# Patient Record
Sex: FEMALE | Race: BLACK OR AFRICAN AMERICAN | NOT HISPANIC OR LATINO | Employment: OTHER | URBAN - METROPOLITAN AREA
[De-identification: names, ages, dates, MRNs, and addresses within clinical notes are randomized per-mention and may not be internally consistent; named-entity substitution may affect disease eponyms.]

---

## 2023-01-09 ENCOUNTER — CLINICAL SUPPORT (OUTPATIENT)
Dept: PRIMARY CARE CLINIC | Facility: CLINIC | Age: 88
End: 2023-01-09
Payer: MEDICARE

## 2023-01-09 ENCOUNTER — TELEPHONE (OUTPATIENT)
Dept: PRIMARY CARE CLINIC | Facility: CLINIC | Age: 88
End: 2023-01-09
Payer: MEDICARE

## 2023-01-09 ENCOUNTER — LAB VISIT (OUTPATIENT)
Dept: PRIMARY CARE CLINIC | Facility: CLINIC | Age: 88
End: 2023-01-09
Payer: MEDICARE

## 2023-01-09 DIAGNOSIS — R53.83 FATIGUE, UNSPECIFIED TYPE: ICD-10-CM

## 2023-01-09 DIAGNOSIS — I10 ESSENTIAL HYPERTENSION, BENIGN: ICD-10-CM

## 2023-01-09 DIAGNOSIS — E11.65 INADEQUATELY CONTROLLED DIABETES MELLITUS: ICD-10-CM

## 2023-01-09 DIAGNOSIS — I10 ESSENTIAL HYPERTENSION, BENIGN: Primary | ICD-10-CM

## 2023-01-09 LAB
ALBUMIN SERPL BCP-MCNC: 3.9 G/DL (ref 3.5–5.2)
ALP SERPL-CCNC: 44 U/L (ref 55–135)
ALT SERPL W/O P-5'-P-CCNC: 8 U/L (ref 10–44)
ANION GAP SERPL CALC-SCNC: 10 MMOL/L (ref 8–16)
AST SERPL-CCNC: 13 U/L (ref 10–40)
BILIRUB SERPL-MCNC: 0.4 MG/DL (ref 0.1–1)
BUN SERPL-MCNC: 18 MG/DL (ref 10–30)
CALCIUM SERPL-MCNC: 9.5 MG/DL (ref 8.7–10.5)
CHLORIDE SERPL-SCNC: 103 MMOL/L (ref 95–110)
CO2 SERPL-SCNC: 26 MMOL/L (ref 23–29)
CREAT SERPL-MCNC: 1.2 MG/DL (ref 0.5–1.4)
EST. GFR  (NO RACE VARIABLE): 42.5 ML/MIN/1.73 M^2
ESTIMATED AVG GLUCOSE: 111 MG/DL (ref 68–131)
GLUCOSE SERPL-MCNC: 95 MG/DL (ref 70–110)
HBA1C MFR BLD: 5.5 % (ref 4–5.6)
POTASSIUM SERPL-SCNC: 4.6 MMOL/L (ref 3.5–5.1)
PROT SERPL-MCNC: 8 G/DL (ref 6–8.4)
SODIUM SERPL-SCNC: 139 MMOL/L (ref 136–145)
TSH SERPL DL<=0.005 MIU/L-ACNC: 0.98 UIU/ML (ref 0.4–4)

## 2023-01-09 PROCEDURE — 82570 ASSAY OF URINE CREATININE: CPT | Performed by: INTERNAL MEDICINE

## 2023-01-09 PROCEDURE — 83036 HEMOGLOBIN GLYCOSYLATED A1C: CPT | Performed by: INTERNAL MEDICINE

## 2023-01-09 PROCEDURE — 80053 COMPREHEN METABOLIC PANEL: CPT | Performed by: INTERNAL MEDICINE

## 2023-01-09 PROCEDURE — 84443 ASSAY THYROID STIM HORMONE: CPT | Performed by: INTERNAL MEDICINE

## 2023-01-10 LAB
ALBUMIN/CREAT UR: 7.3 UG/MG (ref 0–30)
CREAT UR-MCNC: 109 MG/DL (ref 15–325)
MICROALBUMIN UR DL<=1MG/L-MCNC: 8 UG/ML

## 2023-01-31 ENCOUNTER — OFFICE VISIT (OUTPATIENT)
Dept: PRIMARY CARE CLINIC | Facility: CLINIC | Age: 88
End: 2023-01-31
Payer: MEDICARE

## 2023-01-31 VITALS
WEIGHT: 191 LBS | DIASTOLIC BLOOD PRESSURE: 82 MMHG | SYSTOLIC BLOOD PRESSURE: 138 MMHG | BODY MASS INDEX: 28.95 KG/M2 | OXYGEN SATURATION: 97 % | HEART RATE: 76 BPM | HEIGHT: 68 IN | TEMPERATURE: 98 F

## 2023-01-31 DIAGNOSIS — E78.5 HYPERLIPIDEMIA, UNSPECIFIED HYPERLIPIDEMIA TYPE: Primary | ICD-10-CM

## 2023-01-31 DIAGNOSIS — K59.04 CHRONIC IDIOPATHIC CONSTIPATION: ICD-10-CM

## 2023-01-31 PROCEDURE — 1101F PR PT FALLS ASSESS DOC 0-1 FALLS W/OUT INJ PAST YR: ICD-10-PCS | Mod: CPTII,S$GLB,, | Performed by: INTERNAL MEDICINE

## 2023-01-31 PROCEDURE — 3288F PR FALLS RISK ASSESSMENT DOCUMENTED: ICD-10-PCS | Mod: CPTII,S$GLB,, | Performed by: INTERNAL MEDICINE

## 2023-01-31 PROCEDURE — 99213 PR OFFICE/OUTPT VISIT, EST, LEVL III, 20-29 MIN: ICD-10-PCS | Mod: GW,S$GLB,, | Performed by: INTERNAL MEDICINE

## 2023-01-31 PROCEDURE — 1160F PR REVIEW ALL MEDS BY PRESCRIBER/CLIN PHARMACIST DOCUMENTED: ICD-10-PCS | Mod: CPTII,S$GLB,, | Performed by: INTERNAL MEDICINE

## 2023-01-31 PROCEDURE — 1101F PT FALLS ASSESS-DOCD LE1/YR: CPT | Mod: CPTII,S$GLB,, | Performed by: INTERNAL MEDICINE

## 2023-01-31 PROCEDURE — 3288F FALL RISK ASSESSMENT DOCD: CPT | Mod: CPTII,S$GLB,, | Performed by: INTERNAL MEDICINE

## 2023-01-31 PROCEDURE — 1160F RVW MEDS BY RX/DR IN RCRD: CPT | Mod: CPTII,S$GLB,, | Performed by: INTERNAL MEDICINE

## 2023-01-31 PROCEDURE — 99213 OFFICE O/P EST LOW 20 MIN: CPT | Mod: GW,S$GLB,, | Performed by: INTERNAL MEDICINE

## 2023-01-31 PROCEDURE — 1126F PR PAIN SEVERITY QUANTIFIED, NO PAIN PRESENT: ICD-10-PCS | Mod: CPTII,S$GLB,, | Performed by: INTERNAL MEDICINE

## 2023-01-31 PROCEDURE — 1126F AMNT PAIN NOTED NONE PRSNT: CPT | Mod: CPTII,S$GLB,, | Performed by: INTERNAL MEDICINE

## 2023-01-31 PROCEDURE — 1159F PR MEDICATION LIST DOCUMENTED IN MEDICAL RECORD: ICD-10-PCS | Mod: CPTII,S$GLB,, | Performed by: INTERNAL MEDICINE

## 2023-01-31 PROCEDURE — 1159F MED LIST DOCD IN RCRD: CPT | Mod: CPTII,S$GLB,, | Performed by: INTERNAL MEDICINE

## 2023-01-31 RX ORDER — LISINOPRIL 40 MG/1
TABLET ORAL
COMMUNITY
Start: 2022-04-13 | End: 2023-02-16 | Stop reason: SDUPTHER

## 2023-01-31 RX ORDER — HYDROCHLOROTHIAZIDE 12.5 MG/1
1 CAPSULE ORAL DAILY
COMMUNITY
Start: 2022-09-13 | End: 2023-09-13 | Stop reason: SDUPTHER

## 2023-01-31 RX ORDER — FLUOXETINE HYDROCHLORIDE 20 MG/1
20 CAPSULE ORAL DAILY
COMMUNITY
End: 2023-01-31 | Stop reason: SDUPTHER

## 2023-01-31 RX ORDER — LORAZEPAM 0.5 MG/1
TABLET ORAL
COMMUNITY
End: 2024-01-10

## 2023-01-31 RX ORDER — FLUOXETINE HYDROCHLORIDE 20 MG/1
20 CAPSULE ORAL DAILY
Qty: 90 CAPSULE | Refills: 3 | Status: SHIPPED | OUTPATIENT
Start: 2023-01-31 | End: 2024-01-31

## 2023-01-31 RX ORDER — ASPIRIN 81 MG/1
TABLET ORAL
COMMUNITY

## 2023-01-31 RX ORDER — CLOPIDOGREL BISULFATE 75 MG/1
TABLET ORAL
COMMUNITY
Start: 2022-08-04 | End: 2023-03-31 | Stop reason: SDUPTHER

## 2023-01-31 RX ORDER — AMLODIPINE BESYLATE 10 MG/1
TABLET ORAL
COMMUNITY
Start: 2022-09-13 | End: 2023-09-13 | Stop reason: SDUPTHER

## 2023-01-31 RX ORDER — OMEPRAZOLE 40 MG/1
CAPSULE, DELAYED RELEASE ORAL
COMMUNITY
Start: 2022-05-05 | End: 2023-03-20 | Stop reason: SDUPTHER

## 2023-01-31 NOTE — PROGRESS NOTES
Subjective:       Patient ID: Steffany Brady is a 89 y.o. female.    Chief Complaint: Constipation (C/o constip , been out of linzess for 1 day/Also needs Rx for prozac, dep/No SI,HI)      Mrs Sullivan   is an established patient who presents today for a f/u visit and has few requests        Constipation  This is a chronic problem. The current episode started more than 1 year ago. The problem is unchanged. Her stool frequency is 2 to 3 times per week. The patient is not on a high fiber diet. She Does not exercise regularly. There has Not been adequate water intake. Pertinent negatives include no fever. Treatments tried: linzess. The treatment provided moderate relief.   Review of Systems   Constitutional:  Negative for activity change, fever and unexpected weight change.   Respiratory: Negative.     Cardiovascular: Negative.    Gastrointestinal:  Positive for constipation.   Neurological: Negative.        Objective:      Physical Exam  Vitals and nursing note reviewed.   Constitutional:       Appearance: Normal appearance.   Cardiovascular:      Rate and Rhythm: Normal rate and regular rhythm.   Pulmonary:      Effort: Pulmonary effort is normal.      Breath sounds: Normal breath sounds.   Musculoskeletal:      Cervical back: Normal range of motion and neck supple.   Neurological:      Mental Status: She is alert.       Assessment:       1. Chronic idiopathic constipation  Overview:  Constipation: Patient complains of constipation.  Stool pattern has been 3 formed stool(s) per week. Onset was 3 years ago Defecation has been difficult. Co-Morbid conditions:none. Symptoms have been stable. Current Health Habits: Eating fiber? yes, amt not enough Exercise?no Water intake? yes, amt not enough Current OTC/RX therapy has been stimulant linzess which has been effective.        Assessment & Plan:  Refill linzess               Plan:       I spent a total of 15 minutes on the day of the visit.This includes face to face time and  non-face to face time preparing to see the patient (eg, review of tests), obtaining and/or reviewing separately obtained history, documenting clinical information in the electronic or other health record, independently interpreting results and communicating results to the patient/family/caregiver, or care coordinator.

## 2023-02-16 RX ORDER — LISINOPRIL 40 MG/1
40 TABLET ORAL DAILY
Qty: 90 TABLET | Refills: 3 | Status: SHIPPED | OUTPATIENT
Start: 2023-02-16 | End: 2023-03-31 | Stop reason: SDUPTHER

## 2023-03-20 RX ORDER — OMEPRAZOLE 40 MG/1
40 CAPSULE, DELAYED RELEASE ORAL EVERY MORNING
Qty: 90 CAPSULE | Refills: 3 | Status: SHIPPED | OUTPATIENT
Start: 2023-03-20 | End: 2024-03-19

## 2023-03-20 NOTE — TELEPHONE ENCOUNTER
----- Message from Toya Nilda sent at 3/20/2023  9:47 AM CDT -----  Contact: pt nurse  Type:  RX Refill Request-2nd request  Pt is out and needing      Who Called:  the patient nurse    Refill or New Rx:  refill    RX Name and Strength:  omeprazole (PRILOSEC) 40 MG capsule   5/5/2022  --  Sig: omeprazole 40 mg capsule,delayed release      How is the patient currently taking it? (ex. 1XDay):  1x day  90  Is this a 30 day or 90 day RX:  90  Preferred Pharmacy with phone number:      [facility] [address] - [place], [place] - [address]  [address] [place] [address]  Phone: [phone] Fax: [phone]      Local or Mail Order:    Ordering Provider:  mike Antonio Call Back Number:  [phone]    Additional Information:

## 2023-03-30 ENCOUNTER — TELEPHONE (OUTPATIENT)
Dept: FAMILY MEDICINE | Facility: CLINIC | Age: 88
End: 2023-03-30
Payer: MEDICARE

## 2023-03-30 NOTE — TELEPHONE ENCOUNTER
----- Message from Destiney Cook sent at 3/30/2023 10:11 AM CDT -----  Contact: [facility]  Type:  RX Refill Request    Who Called: HOME HEALTH  Refill or New Rx:REFILL   RX Name and Strength: PLEASE CALL PT FOR RX NAME- PT STATED THAT THE LABEL WORE OFF THE BOTTLE. PHARMACY SENT OVER 2 REFILL REQUESTS TO OFFICE   How is the patient currently taking it? (ex. 1XDay):ONCE A DAY   Is this a 30 day or 90 day RX:90  Preferred Pharmacy with phone number:  [facility] [place] - [address]  [address]   [place] [address]  Phone: [phone] Fax: [phone]      Local or Mail Order:LOCAL  Ordering Provider:TULIO  Would the patient rather a call back or a response via [facility]? CALL   Best Call Back Number:[phone] (home) [phone] (work)    Additional Information: THANK YOU

## 2023-03-31 NOTE — TELEPHONE ENCOUNTER
----- Message from Julita Duvall sent at 3/31/2023 10:22 AM CDT -----  Contact: called at [phone]  Type:  RX Refill Request    Who Called: [facility] Nurse  Refill or New Rx:  Refill  RX Name and Strength:  lisinopriL (PRINIVIL,ZESTRIL) 40 MG tablet (clopidogreL (PLAVIX) 75 mg tablet)  How is the patient currently taking it? (ex. 1XDay):  As directed   Is this a 30 day or 90 day RX:  90  Preferred Pharm[facility] with phone number:    [facility] [place] [facility] [address]  [address] [address]  [place] [address]  Phone: [phone] Fax: [phone]  Local or Mail Order:  Local  Ordering Provider:  Dr. Eder Antonio Call Back Number: Pt's number [phone]  Additional Information:  The nurse is calling to get the pt's medication refilled. Med names lisinopriL (PRINIVIL,ZESTRIL) 40 MG tablet (clopidogreL (PLAVIX) 75 mg tablet). Please call back and advise.

## 2023-04-01 RX ORDER — LISINOPRIL 40 MG/1
40 TABLET ORAL DAILY
Qty: 90 TABLET | Refills: 3 | Status: SHIPPED | OUTPATIENT
Start: 2023-04-01 | End: 2023-04-04 | Stop reason: SDUPTHER

## 2023-04-01 RX ORDER — CLOPIDOGREL BISULFATE 75 MG/1
75 TABLET ORAL DAILY
Qty: 90 TABLET | Refills: 3 | Status: SHIPPED | OUTPATIENT
Start: 2023-04-01 | End: 2024-03-31

## 2023-04-04 RX ORDER — LISINOPRIL 40 MG/1
40 TABLET ORAL DAILY
Qty: 90 TABLET | Refills: 3 | Status: SHIPPED | OUTPATIENT
Start: 2023-04-04 | End: 2023-09-13 | Stop reason: SDUPTHER

## 2023-04-04 NOTE — TELEPHONE ENCOUNTER
----- Message from Iram Peacock sent at 4/4/2023  9:34 AM CDT -----  Type:  RX Refill Request    Who Called:  pt home health nurse--said she keep calling and pt med have not been refilled and she need this med--VIP--  Refill or New Rx:  refill  RX Name and Strength:  lisinopriL (PRINIVIL,ZESTRIL) 40 MG tablet  How is the patient currently taking it? (ex. 1XDay):  as directed  Is this a 30 day or 90 day RX:  90  Preferred Pharmacy with phone number:    [facility] - [facility] [place] - [address]  [address]   [facility] [place] [address]  Phone: [phone] Fax: [phone]    Local or Mail Order:  local  Ordering Provider:  Delano Antonio Call Back Number:  [phone] (home) [phone] (work)    Additional Information:  said the pharmacy called 3 times and have not been able to get in touch with the office--said she really need this filled--please call and advise

## 2023-05-02 ENCOUNTER — OFFICE VISIT (OUTPATIENT)
Dept: PRIMARY CARE CLINIC | Facility: CLINIC | Age: 88
End: 2023-05-02
Payer: MEDICARE

## 2023-05-02 VITALS
TEMPERATURE: 99 F | BODY MASS INDEX: 28.89 KG/M2 | DIASTOLIC BLOOD PRESSURE: 65 MMHG | RESPIRATION RATE: 16 BRPM | SYSTOLIC BLOOD PRESSURE: 100 MMHG | OXYGEN SATURATION: 98 % | HEIGHT: 68 IN | HEART RATE: 62 BPM | WEIGHT: 190.63 LBS

## 2023-05-02 DIAGNOSIS — I49.5 SSS (SICK SINUS SYNDROME): ICD-10-CM

## 2023-05-02 DIAGNOSIS — Z00.00 ENCOUNTER FOR ANNUAL WELLNESS VISIT (AWV) IN MEDICARE PATIENT: ICD-10-CM

## 2023-05-02 DIAGNOSIS — M35.3 PMR (POLYMYALGIA RHEUMATICA): Primary | ICD-10-CM

## 2023-05-02 DIAGNOSIS — I10 ESSENTIAL HYPERTENSION, BENIGN: ICD-10-CM

## 2023-05-02 DIAGNOSIS — E78.2 MIXED HYPERLIPIDEMIA: ICD-10-CM

## 2023-05-02 DIAGNOSIS — N18.31 STAGE 3A CHRONIC KIDNEY DISEASE: ICD-10-CM

## 2023-05-02 LAB
ALBUMIN SERPL BCP-MCNC: 3.7 G/DL (ref 3.5–5.2)
ALP SERPL-CCNC: 46 U/L (ref 55–135)
ALT SERPL W/O P-5'-P-CCNC: 12 U/L (ref 10–44)
ANION GAP SERPL CALC-SCNC: 11 MMOL/L (ref 8–16)
AST SERPL-CCNC: 14 U/L (ref 10–40)
BILIRUB SERPL-MCNC: 0.4 MG/DL (ref 0.1–1)
BUN SERPL-MCNC: 16 MG/DL (ref 10–30)
CALCIUM SERPL-MCNC: 9.4 MG/DL (ref 8.7–10.5)
CHLORIDE SERPL-SCNC: 105 MMOL/L (ref 95–110)
CHOLEST SERPL-MCNC: 180 MG/DL (ref 120–199)
CHOLEST/HDLC SERPL: 4.2 {RATIO} (ref 2–5)
CO2 SERPL-SCNC: 24 MMOL/L (ref 23–29)
CREAT SERPL-MCNC: 1.2 MG/DL (ref 0.5–1.4)
EST. GFR  (NO RACE VARIABLE): 42.5 ML/MIN/1.73 M^2
GLUCOSE SERPL-MCNC: 87 MG/DL (ref 70–110)
HDLC SERPL-MCNC: 43 MG/DL (ref 40–75)
HDLC SERPL: 23.9 % (ref 20–50)
LDLC SERPL CALC-MCNC: 115 MG/DL (ref 63–159)
NONHDLC SERPL-MCNC: 137 MG/DL
POTASSIUM SERPL-SCNC: 4.2 MMOL/L (ref 3.5–5.1)
PROT SERPL-MCNC: 7.8 G/DL (ref 6–8.4)
SODIUM SERPL-SCNC: 140 MMOL/L (ref 136–145)
TRIGL SERPL-MCNC: 110 MG/DL (ref 30–150)

## 2023-05-02 PROCEDURE — 1160F RVW MEDS BY RX/DR IN RCRD: CPT | Mod: CPTII,S$GLB,, | Performed by: INTERNAL MEDICINE

## 2023-05-02 PROCEDURE — G0439 PR MEDICARE ANNUAL WELLNESS SUBSEQUENT VISIT: ICD-10-PCS | Mod: GW,S$GLB,, | Performed by: INTERNAL MEDICINE

## 2023-05-02 PROCEDURE — 80053 COMPREHEN METABOLIC PANEL: CPT | Performed by: INTERNAL MEDICINE

## 2023-05-02 PROCEDURE — G0439 PPPS, SUBSEQ VISIT: HCPCS | Mod: GW,S$GLB,, | Performed by: INTERNAL MEDICINE

## 2023-05-02 PROCEDURE — 1101F PR PT FALLS ASSESS DOC 0-1 FALLS W/OUT INJ PAST YR: ICD-10-PCS | Mod: CPTII,S$GLB,, | Performed by: INTERNAL MEDICINE

## 2023-05-02 PROCEDURE — 1101F PT FALLS ASSESS-DOCD LE1/YR: CPT | Mod: CPTII,S$GLB,, | Performed by: INTERNAL MEDICINE

## 2023-05-02 PROCEDURE — 1125F AMNT PAIN NOTED PAIN PRSNT: CPT | Mod: CPTII,S$GLB,, | Performed by: INTERNAL MEDICINE

## 2023-05-02 PROCEDURE — 1160F PR REVIEW ALL MEDS BY PRESCRIBER/CLIN PHARMACIST DOCUMENTED: ICD-10-PCS | Mod: CPTII,S$GLB,, | Performed by: INTERNAL MEDICINE

## 2023-05-02 PROCEDURE — 1159F PR MEDICATION LIST DOCUMENTED IN MEDICAL RECORD: ICD-10-PCS | Mod: CPTII,S$GLB,, | Performed by: INTERNAL MEDICINE

## 2023-05-02 PROCEDURE — 3288F PR FALLS RISK ASSESSMENT DOCUMENTED: ICD-10-PCS | Mod: CPTII,S$GLB,, | Performed by: INTERNAL MEDICINE

## 2023-05-02 PROCEDURE — 1125F PR PAIN SEVERITY QUANTIFIED, PAIN PRESENT: ICD-10-PCS | Mod: CPTII,S$GLB,, | Performed by: INTERNAL MEDICINE

## 2023-05-02 PROCEDURE — 80061 LIPID PANEL: CPT | Performed by: INTERNAL MEDICINE

## 2023-05-02 PROCEDURE — 3288F FALL RISK ASSESSMENT DOCD: CPT | Mod: CPTII,S$GLB,, | Performed by: INTERNAL MEDICINE

## 2023-05-02 PROCEDURE — 1159F MED LIST DOCD IN RCRD: CPT | Mod: CPTII,S$GLB,, | Performed by: INTERNAL MEDICINE

## 2023-05-02 RX ORDER — DOCUSATE SODIUM 100 MG/1
100 CAPSULE, LIQUID FILLED ORAL 2 TIMES DAILY
COMMUNITY

## 2023-05-02 NOTE — Clinical Note
I'll scan all immuniz record into her chart I think only missing measure is COVID booster She got shingrix on 7/9/22 & 9/20/22 I'll order lipids today

## 2023-05-02 NOTE — ASSESSMENT & PLAN NOTE
I'll scan all immuniz record into her chart  I think only missing measure is COVID booster  She got shingrix on 7/9/22 & 9/20/22  I'll order lipids today  Walking , diet modif d/w pt  Eye : Dr MAN Holman : T [phone] , e-fax : [phone]

## 2023-05-02 NOTE — PROGRESS NOTES
Subjective:       Patient ID: Steffany Brady is a 89 y.o. female.    Chief Complaint: Follow-up (3 Month) and Annual Exam ([facility])      Annual Medicare wellness visit :  Reported by patient.  Diet and nutrition:  Healthy diet  Fracture risk:  No history of fractures; no recent explain infection; no sudden unexplained fractures; previous musculoskeletal injuries  Physical activity:  Exercise on a regular basis; recent increase in physical activity; good  Physical condition  Depression risk:  Never feels sad, empty or tearful; no loss of interest in activities; no significant changes in weight; no sleep disturbances or insomnia; no agitation; no loss of energy; no feelings of worthlessness or guilt; no thoughts of suicide; no history of depression; no history of mood disorders  Orientation:  No disorientation to time; no disorientation to date; no disorientation to place  Concentration and memory:  No decreased concentration ability; no memory lapses or loss; does not forget words  Speech/motor difficulties:  No speech difficulties; no difficulty expressing formulated concepts; no difficulty with fine manipulative tasks; no difficulty writing/coping; no slowed reaction time/; does not knock things over with trying to pick them up  Hearing: No loss of hearing  Vision:  No vision problems  Activities of daily living:  Able to bathe with limited or no assistance; able to control urination and bowels; able to dress with limited or no assistance; able to feed herself with limited or no assistance; able to get out of chair or bed with limited or no assistance; able to Groom with limited or no assistance; able to toilet with limited or no assistance  Instrumental activities of daily living: Able to do housework with limited or no assistance; able to grocery shop with limited or no assistance; able to manage medications with limited or no assistance; able to manage money with limited or no assistance; able to prepare meals  with limited or no assistance; able to use the phone with limited or no assistance  Falls risk assessment: No frequent falls while walking; no 4 in the past year; no falls since last visit; no dizziness/vertigo  Home safety:  No unsafe heike hazards; no unsafe stairs; no unsafe gas appliances; Working smoke/CO detectors; wears protective head gear for biking/high velocity; use of seatbelts; practicing 'safer sex'; no vision or hearing loss while driving; no firearms; has hand bars in the bathroom/shower; good lighting in the home      Follow-up    Review of Systems     Constitutional: No fever, no night sweats, no significant weight gain, no significant weight loss, no exercise intolerance.    Eyes: No dry eyes, no irritation, no vision change.    ENMT:    Ears: No difficulty hearing, no ear pain    Nose: No frequent nosebleeds, no nose/sinus problems    Mouth/throat: No sore throat, no bleeding gums, no snoring, no dry mouth, no mouth ulcers, no oral abnormalities, no teeth problems    Cardiovascular: No chest pain, no arm pain on exertion, no shortness of breath when walking, no shortness of breath when lying down, no palpitations, no known heart murmur    Respiratory: No cough, no wheezing, no shortness of breath, no coughing up blood    Gastrointestinal: No abdominal pain, no vomiting, normal appetite, no diarrhea, not vomiting blood.    Genitourinary: + incontinence, + difficulty urinating, + hematuria, + increased frequency.    Musculoskeletal: + muscle aches, + muscle weakness, + arthralgias/joint pain, no back pain, + swelling in extremities.    Skin: No abnormal mole, no jaundice, no rashes.    Neurologic: No loss of consciousness, + weakness, + numbness, no seizures, no dizziness, no headaches.    Psychiatric: Occasional depression, no sleep disturbances, feeling safe in the relationship, no alcohol abuse.    Endocrine: + fatigue.    Hematologic/lymphatic: No swollen glands, no  bruising.    Allergic/immunologic: No runny nose, no sinus pressure, no itching or hives, no frequent sneezing.    Review for Opioid Screening: Pt does not have Rx for Opioids (If patient has Rx list here)      Review for Substance Use Disorders: Patient does not use substance (If patient has Rx list here)             Objective:      Physical Exam  Frail PE :-    Constitutional:  General appearance: Frail elderly AA female using her cane & walker but in no acute disress  Level of distress: : NAD   Ambulation: ambulates.............. with assisstance      Head: Normocephalic, atraumatic.    ENMT    Oropharynx: Moist mucous membranes, no erythema, no exudates  Neck: Supple, trachea midline, no masses,FROM      Lungs  Respiratory effort: Poor  Auscultation: Breath sounds normal, poor air movement, CTA except as noted, no wheezing or rales/crackles, few ronchi    Cardiovascular:  Heart auscultation:RRR, normal S1, normal S2, no murmurs, no rubs, no gallops.  Neck vessels: No JVD, no carotid bruits      Abdominal examination.  Bowel sounds:normal  Inspection and palpation: Soft, nondistended, no tenderness, no guarding, no rebound tenderness, no masses, no CVA tenderness.        Musculoskeletal.  Deferred due to wheelchair status    Neurologic.  Gait: Tested secondary to electric scooter status    Creased monofilament sensation in upper and lower extremities      Skin.  Inspection and palpation: No rash, no lesions, no ulcers, no abnormal nevi, no induration, no nodules, good turgor, no jaundice.    Back: Deferred.    Assessment:       1. PMR (polymyalgia rheumatica)    2. SSS (sick sinus syndrome)    3. Stage 3a chronic kidney disease    4. Mixed hyperlipidemia  -     Lipid Panel; Future; Expected date: 05/02/2023    5. Essential hypertension, benign  -     Comprehensive Metabolic Panel; Future; Expected date: 05/02/2023    6. Encounter for annual wellness visit (AWV) in Medicare patient  Overview:  She presents for a  [employer] AWV    Assessment & Plan:  I'll scan all immuniz record into her chart  I think only missing measure is COVID booster  She got shingrix on 7/9/22 & 9/20/22  I'll order lipids today  Walking , diet modif d/w pt  Eye : Dr MAN Holman : [phone] , e-fax : [phone]               Plan:       1. PMR (polymyalgia rheumatica)    2. SSS (sick sinus syndrome)    3. Stage 3a chronic kidney disease    4. Mixed hyperlipidemia  -     Lipid Panel; Future; Expected date: 05/02/2023    5. Essential hypertension, benign  -     Comprehensive Metabolic Panel; Future; Expected date: 05/02/2023    6. Encounter for annual wellness visit (AWV) in Medicare patient  Overview:  She presents for a [employer] AWV    Assessment & Plan:  I'll scan all immuniz record into her chart  I think only missing measure is COVID booster  She got shingrix on 7/9/22 & 9/20/22  I'll order lipids today  Walking , diet modif d/w pt  Eye : Dr MAN Holman : [phone] , e-fax : [phone]    I offered to discuss end of life issues, including information on how to make advance directives that the patient could use to name someone who would make medical decisions on their behalf if they became too ill to make themselves.      __Patient declined       _X__Patient is interested, I provided paperwork and offered to discuss       Pain level : 0/10    Urinary incont : sometimes but not a big problem to patient

## 2023-05-03 ENCOUNTER — PATIENT OUTREACH (OUTPATIENT)
Dept: ADMINISTRATIVE | Facility: HOSPITAL | Age: 88
End: 2023-05-03
Payer: MEDICARE

## 2023-05-03 NOTE — PROGRESS NOTES
Population Health chart review completed, no outreach sent at this time.   The following record(s)  below were uploaded for Health Maintenance .    Records Received, hyper-linked into chart at this time.  IMMUNIZATIONS

## 2023-06-09 ENCOUNTER — OFFICE VISIT (OUTPATIENT)
Dept: PRIMARY CARE CLINIC | Facility: CLINIC | Age: 88
End: 2023-06-09
Payer: MEDICARE

## 2023-06-09 VITALS
OXYGEN SATURATION: 96 % | HEIGHT: 68 IN | HEART RATE: 72 BPM | SYSTOLIC BLOOD PRESSURE: 130 MMHG | DIASTOLIC BLOOD PRESSURE: 72 MMHG | WEIGHT: 192 LBS | TEMPERATURE: 98 F | BODY MASS INDEX: 29.1 KG/M2

## 2023-06-09 DIAGNOSIS — D17.21 LIPOMA OF RIGHT UPPER EXTREMITY: ICD-10-CM

## 2023-06-09 DIAGNOSIS — D17.21 LIPOMA OF RIGHT SHOULDER: Primary | ICD-10-CM

## 2023-06-09 DIAGNOSIS — Z00.00 PREVENTATIVE HEALTH CARE: ICD-10-CM

## 2023-06-09 DIAGNOSIS — R54 FRAIL ELDERLY: ICD-10-CM

## 2023-06-09 PROCEDURE — 99212 PR OFFICE/OUTPT VISIT, EST, LEVL II, 10-19 MIN: ICD-10-PCS | Mod: GW,S$GLB,, | Performed by: INTERNAL MEDICINE

## 2023-06-09 PROCEDURE — 1159F PR MEDICATION LIST DOCUMENTED IN MEDICAL RECORD: ICD-10-PCS | Mod: CPTII,S$GLB,, | Performed by: INTERNAL MEDICINE

## 2023-06-09 PROCEDURE — 99212 OFFICE O/P EST SF 10 MIN: CPT | Mod: GW,S$GLB,, | Performed by: INTERNAL MEDICINE

## 2023-06-09 PROCEDURE — 1159F MED LIST DOCD IN RCRD: CPT | Mod: CPTII,S$GLB,, | Performed by: INTERNAL MEDICINE

## 2023-06-09 PROCEDURE — 1125F AMNT PAIN NOTED PAIN PRSNT: CPT | Mod: CPTII,S$GLB,, | Performed by: INTERNAL MEDICINE

## 2023-06-09 PROCEDURE — 1125F PR PAIN SEVERITY QUANTIFIED, PAIN PRESENT: ICD-10-PCS | Mod: CPTII,S$GLB,, | Performed by: INTERNAL MEDICINE

## 2023-06-09 NOTE — PROGRESS NOTES
"Subjective:       Patient ID: Steffany Brady is a 89 y.o. female.    Chief Complaint: Cyst (Right shoulder. Abx given at last visit and it has not helped.)      Patient is established already .......... presents today with a c/o pain, lump of R shoulder        Cyst  Pertinent negatives include no fever.   Mass  This is a new problem. The current episode started 1 to 4 weeks ago. The problem occurs constantly. The problem has been gradually worsening. Pertinent negatives include no fever. Associated symptoms comments: Back pains. Nothing aggravates the symptoms. Treatments tried: Antibiotics.   Review of Systems   Constitutional:  Negative for activity change, fever and unexpected weight change.   Respiratory: Negative.     Cardiovascular: Negative.    Integumentary:  Positive for mole/lesion.   Neurological: Negative.        Objective:      Physical Exam  Vitals and nursing note reviewed. Exam conducted with a chaperone present.   Constitutional:       Appearance: Normal appearance.   HENT:      Head: Normocephalic and atraumatic.   Eyes:      Extraocular Movements: Extraocular movements intact.      Pupils: Pupils are equal, round, and reactive to light.   Cardiovascular:      Rate and Rhythm: Normal rate and regular rhythm.      Pulses: Normal pulses.      Heart sounds: Normal heart sounds.   Pulmonary:      Effort: Pulmonary effort is normal.      Breath sounds: Normal breath sounds.   Abdominal:      General: Abdomen is flat. Bowel sounds are normal.      Palpations: Abdomen is soft.   Musculoskeletal:      Cervical back: Normal range of motion and neck supple.      Comments: There's a 2 by 2" tender lipoma like lesion of back of R shoulder, mobile  No d/c , redness around it   Neurological:      Mental Status: She is alert.       Assessment:       1. Lipoma of right shoulder  -     Ambulatory referral/consult to Orthopedics; Future; Expected date: 06/16/2023    2. Lipoma of right upper extremity  Overview:  Started " 2 weeks ago    Assessment & Plan:  A lipoma of R shoulder  R/o inf sebaceous cyst  Refer to Ortho      3. Preventative health care  Overview:  No new c/o      Assessment & Plan:  Recommended COVID booster      4. Frail elderly  Overview:  With end stage CHF ,dementia      Assessment & Plan:  Cont hospice               Plan:       1. Lipoma of right shoulder  -     Ambulatory referral/consult to Orthopedics; Future; Expected date: 06/16/2023    2. Lipoma of right upper extremity  Overview:  Started 2 weeks ago    Assessment & Plan:  A lipoma of R shoulder  R/o inf sebaceous cyst  Refer to Ortho      3. Preventative health care  Overview:  No new c/o      Assessment & Plan:  Recommended COVID booster      4. Frail elderly  Overview:  With end stage CHF ,dementia      Assessment & Plan:  Cont hospice

## 2023-08-03 ENCOUNTER — OFFICE VISIT (OUTPATIENT)
Dept: PRIMARY CARE CLINIC | Facility: CLINIC | Age: 88
End: 2023-08-03
Payer: MEDICARE

## 2023-08-03 VITALS
DIASTOLIC BLOOD PRESSURE: 68 MMHG | OXYGEN SATURATION: 96 % | TEMPERATURE: 99 F | BODY MASS INDEX: 34.91 KG/M2 | HEART RATE: 71 BPM | RESPIRATION RATE: 18 BRPM | SYSTOLIC BLOOD PRESSURE: 132 MMHG | HEIGHT: 62 IN | WEIGHT: 189.69 LBS

## 2023-08-03 DIAGNOSIS — N18.31 CHRONIC KIDNEY DISEASE (CKD) STAGE G3A/A1, MODERATELY DECREASED GLOMERULAR FILTRATION RATE (GFR) BETWEEN 45-59 ML/MIN/1.73 SQUARE METER AND ALBUMINURIA CREATININE RATIO LESS THAN 30 MG/G: ICD-10-CM

## 2023-08-03 DIAGNOSIS — N18.31 STAGE 3A CHRONIC KIDNEY DISEASE: ICD-10-CM

## 2023-08-03 DIAGNOSIS — R64 CACHEXIA: Primary | ICD-10-CM

## 2023-08-03 DIAGNOSIS — R01.1 MURMUR, CARDIAC: ICD-10-CM

## 2023-08-03 PROCEDURE — 1160F PR REVIEW ALL MEDS BY PRESCRIBER/CLIN PHARMACIST DOCUMENTED: ICD-10-PCS | Mod: CPTII,S$GLB,, | Performed by: INTERNAL MEDICINE

## 2023-08-03 PROCEDURE — 99214 OFFICE O/P EST MOD 30 MIN: CPT | Mod: GW,S$GLB,, | Performed by: INTERNAL MEDICINE

## 2023-08-03 PROCEDURE — 1159F PR MEDICATION LIST DOCUMENTED IN MEDICAL RECORD: ICD-10-PCS | Mod: CPTII,S$GLB,, | Performed by: INTERNAL MEDICINE

## 2023-08-03 PROCEDURE — 1159F MED LIST DOCD IN RCRD: CPT | Mod: CPTII,S$GLB,, | Performed by: INTERNAL MEDICINE

## 2023-08-03 PROCEDURE — 1160F RVW MEDS BY RX/DR IN RCRD: CPT | Mod: CPTII,S$GLB,, | Performed by: INTERNAL MEDICINE

## 2023-08-03 PROCEDURE — 99214 PR OFFICE/OUTPT VISIT, EST, LEVL IV, 30-39 MIN: ICD-10-PCS | Mod: GW,S$GLB,, | Performed by: INTERNAL MEDICINE

## 2023-08-03 RX ORDER — VIT C/E/ZN/COPPR/LUTEIN/ZEAXAN 250MG-90MG
1000 CAPSULE ORAL
COMMUNITY

## 2023-08-03 RX ORDER — ONDANSETRON 4 MG/1
4 TABLET, ORALLY DISINTEGRATING ORAL EVERY 8 HOURS PRN
Qty: 20 TABLET | Refills: 2 | Status: SHIPPED | OUTPATIENT
Start: 2023-08-03

## 2023-08-03 RX ORDER — ONDANSETRON 8 MG/1
TABLET, ORALLY DISINTEGRATING ORAL
COMMUNITY
End: 2023-08-03

## 2023-08-03 RX ORDER — PRAVASTATIN SODIUM 20 MG/1
20 TABLET ORAL
COMMUNITY
End: 2024-01-10

## 2023-08-03 RX ORDER — FLUTICASONE PROPIONATE 50 MCG
SPRAY, SUSPENSION (ML) NASAL
COMMUNITY
Start: 2022-05-05 | End: 2023-09-13 | Stop reason: SDUPTHER

## 2023-08-03 RX ORDER — NYSTATIN 100000 U/G
CREAM TOPICAL
COMMUNITY

## 2023-08-03 RX ORDER — PRENATAL VIT CALC,IRON,FOLIC
1 TABLET ORAL DAILY
COMMUNITY

## 2023-08-03 RX ORDER — FLUOCINONIDE 0.5 MG/G
CREAM TOPICAL
COMMUNITY

## 2023-08-03 NOTE — PROGRESS NOTES
Subjective:       Patient ID: Steffany Brady is a 89 y.o. female.    Chief Complaint: Follow-up (3 month) and Congestive Heart Failure (With c/o of exertional dyspnea)      Patient is established already .......... presents today for a f/u visit , to discuss labs results and for management of the chronic conditions dafne CKD, exertional SOB        Follow-up  Pertinent negatives include no fever.   Congestive Heart Failure  Associated symptoms include shortness of breath. Pertinent negatives include no unexpected weight change. Her past medical history is significant for CAD.   Shortness of Breath  This is a chronic problem. The current episode started more than 1 year ago. The problem occurs intermittently. The problem has been unchanged. Associated symptoms include leg swelling and orthopnea. Pertinent negatives include no fever. The symptoms are aggravated by emotional upset and any activity. Treatments tried: See MAR , lasix. The treatment provided moderate relief. Her past medical history is significant for CAD and a heart failure.     Review of Systems   Constitutional:  Negative for activity change, fever and unexpected weight change.   Respiratory:  Positive for shortness of breath.    Cardiovascular:  Positive for orthopnea and leg swelling.   Neurological: Negative.          Objective:      Physical Exam  Vitals and nursing note reviewed.   Constitutional:       Comments: Pre frail  female walking with assistance and accompanied by caregiver   Cardiovascular:      Rate and Rhythm: Normal rate and regular rhythm.   Pulmonary:      Effort: Pulmonary effort is normal.      Breath sounds: Rhonchi and rales present.   Musculoskeletal:      Cervical back: Normal range of motion and neck supple.      Right lower leg: Edema present.      Left lower leg: Edema present.      Comments: Mild b/l ankle edema   Neurological:      Mental Status: She is alert.         Assessment:       1. Cachexia    2. Murmur,  cardiac  Overview:  Chronic with inc in exertional SOB recently    Assessment & Plan:  Cont to monitor  T/c inc lasix , adding spironolactone      3. Stage 3a chronic kidney disease  Overview:  Monitor  I discussed renal condition with patient at length  We discussed diet modification specifically decreasing salt intake, avoiding fast food ,avoiding fried food  We also discussed the importance of minimizing the use of anti-inflammatory agents like Motrin ibuprofen naproxen Aleve etc.  We discussed the importance of increasing fluid intake specifically water and also stay hydrated  We discussed minimizing the use of diuretics as much as clinically possible  We are going to monitor renal functions BUN creatinine urine microalbumin and electrolytes closely  To consider referral to Nephrology Service if renal fx continue to decline  To consider adding an SGL- 2 inhibitor if renal functions continue to decline, regardless of the blood sugar status      Orders:  -     Microalbumin/Creatinine Ratio, Urine; Future; Expected date: 08/03/2023  -     Comprehensive Metabolic Panel; Future; Expected date: 08/03/2023    4. Chronic kidney disease (CKD) stage G3a/A1, moderately decreased glomerular filtration rate (GFR) between 45-59 mL/min/1.73 square meter and albuminuria creatinine ratio less than 30 mg/g  -     Microalbumin/Creatinine Ratio, Urine; Future; Expected date: 08/03/2023  -     Comprehensive Metabolic Panel; Future; Expected date: 08/03/2023    Other orders  -     ondansetron (ZOFRAN-ODT) 4 MG TbDL; Take 1 tablet (4 mg total) by mouth every 8 (eight) hours as needed (nausea).  Dispense: 20 tablet; Refill: 2             Plan:       1. Cachexia    2. Murmur, cardiac  Overview:  Chronic with inc in exertional SOB recently    Assessment & Plan:  Cont to monitor  T/c inc lasix , adding spironolactone      3. Stage 3a chronic kidney disease  Overview:  Monitor  I discussed renal condition with patient at length  We  discussed diet modification specifically decreasing salt intake, avoiding fast food ,avoiding fried food  We also discussed the importance of minimizing the use of anti-inflammatory agents like Motrin ibuprofen naproxen Aleve etc.  We discussed the importance of increasing fluid intake specifically water and also stay hydrated  We discussed minimizing the use of diuretics as much as clinically possible  We are going to monitor renal functions BUN creatinine urine microalbumin and electrolytes closely  To consider referral to Nephrology Service if renal fx continue to decline  To consider adding an SGL- 2 inhibitor if renal functions continue to decline, regardless of the blood sugar status      Orders:  -     Microalbumin/Creatinine Ratio, Urine; Future; Expected date: 08/03/2023  -     Comprehensive Metabolic Panel; Future; Expected date: 08/03/2023    4. Chronic kidney disease (CKD) stage G3a/A1, moderately decreased glomerular filtration rate (GFR) between 45-59 mL/min/1.73 square meter and albuminuria creatinine ratio less than 30 mg/g  -     Microalbumin/Creatinine Ratio, Urine; Future; Expected date: 08/03/2023  -     Comprehensive Metabolic Panel; Future; Expected date: 08/03/2023    Other orders  -     ondansetron (ZOFRAN-ODT) 4 MG TbDL; Take 1 tablet (4 mg total) by mouth every 8 (eight) hours as needed (nausea).  Dispense: 20 tablet; Refill: 2

## 2023-08-07 PROBLEM — Z00.00 ENCOUNTER FOR ANNUAL WELLNESS VISIT (AWV) IN MEDICARE PATIENT: Status: RESOLVED | Noted: 2023-05-02 | Resolved: 2023-08-07

## 2023-09-11 PROBLEM — Z00.00 PREVENTATIVE HEALTH CARE: Status: RESOLVED | Noted: 2023-06-09 | Resolved: 2023-09-11

## 2023-09-13 RX ORDER — HYDROCHLOROTHIAZIDE 12.5 MG/1
12.5 CAPSULE ORAL DAILY
Qty: 90 CAPSULE | Refills: 1 | Status: SHIPPED | OUTPATIENT
Start: 2023-09-13 | End: 2023-09-15

## 2023-09-13 RX ORDER — LISINOPRIL 40 MG/1
40 TABLET ORAL DAILY
Qty: 90 TABLET | Refills: 3 | Status: SHIPPED | OUTPATIENT
Start: 2023-09-13 | End: 2024-09-12

## 2023-09-13 RX ORDER — AMLODIPINE BESYLATE 10 MG/1
TABLET ORAL
Qty: 90 TABLET | Refills: 3 | Status: SHIPPED | OUTPATIENT
Start: 2023-09-13

## 2023-09-13 RX ORDER — FLUTICASONE PROPIONATE 50 MCG
SPRAY, SUSPENSION (ML) NASAL
Qty: 16 G | Refills: 4 | Status: SHIPPED | OUTPATIENT
Start: 2023-09-13

## 2023-09-13 NOTE — TELEPHONE ENCOUNTER
Brianna Gaines,  Please review message below.  Patient is totally out of blood pressure medications  Last office visit: 8/3/2023  Signed:  Jeremy Flores LPN    ----- Message from Nina Sanderson sent at 9/13/2023  4:07 PM CDT -----  Name of Who is Calling: pt        What is the request in detail: pt would like to speak with staff in regards to there BP medication. Pt would like to know if she can receive a few pills until she can be seen. Pt stated she is completely out of her BP pill. Please assist with this matter        Can the clinic reply by [facility]: no        What Number to Call Back if not in [facility]: [phone] (home) [phone] (work)

## 2023-09-15 ENCOUNTER — TELEPHONE (OUTPATIENT)
Dept: FAMILY MEDICINE | Facility: CLINIC | Age: 88
End: 2023-09-15
Payer: MEDICARE

## 2023-09-15 RX ORDER — HYDROCHLOROTHIAZIDE 12.5 MG/1
12.5 CAPSULE ORAL EVERY OTHER DAY
Qty: 45 CAPSULE | Refills: 1 | Status: SHIPPED | OUTPATIENT
Start: 2023-09-15 | End: 2024-01-10

## 2023-09-15 NOTE — TELEPHONE ENCOUNTER
----- Message from Destiney Cook sent at 9/14/2023  4:34 PM CDT -----  Contact: CHAITANYA LOPEZ  Type:  Pharmacy Calling to Clarify an RX    Name of Caller:  Pharmacy Name:[facility]   Prescription Name: hydroCHLOROthiazide (MICROZIDE) 12.5 mg capsule  What do they need to clarify?:CONFIRM DIRECTIONS - PT STATED THAT SHE NORMALLY TAKES THIS MED EVERY OTHER DAY   Best Call Back Number: [phone] / [phone]  Additional Information: THANK YOU

## 2023-09-15 NOTE — TELEPHONE ENCOUNTER
----- Message from Destiney Cook sent at 9/14/2023  4:34 PM CDT -----  Contact: CHAITANYA LOPEZ  Type:  Pharmacy Calling to Clarify an RX    Name of Caller:  Pharmacy Name:[facility]   Prescription Name: hydroCHLOROthiazide (MICROZIDE) 12.5 mg capsule  What do they need to clarify?:CONFIRM DIRECTIONS - PT STATED THAT SHE NORMALLY TAKES THIS MED EVERY OTHER DAY   Best Call Back Number: [phone] / [phone]  Additional Information: THANK YOU

## 2023-11-20 ENCOUNTER — OFFICE VISIT (OUTPATIENT)
Dept: FAMILY MEDICINE | Facility: CLINIC | Age: 88
End: 2023-11-20
Payer: MEDICARE

## 2023-11-20 VITALS
BODY MASS INDEX: 33.43 KG/M2 | TEMPERATURE: 99 F | SYSTOLIC BLOOD PRESSURE: 130 MMHG | WEIGHT: 181.63 LBS | HEIGHT: 62 IN | DIASTOLIC BLOOD PRESSURE: 70 MMHG

## 2023-11-20 DIAGNOSIS — R52 ACHING: Primary | ICD-10-CM

## 2023-11-20 PROCEDURE — 1125F PR PAIN SEVERITY QUANTIFIED, PAIN PRESENT: ICD-10-PCS | Mod: CPTII,S$GLB,, | Performed by: INTERNAL MEDICINE

## 2023-11-20 PROCEDURE — 1160F RVW MEDS BY RX/DR IN RCRD: CPT | Mod: CPTII,S$GLB,, | Performed by: INTERNAL MEDICINE

## 2023-11-20 PROCEDURE — 1159F MED LIST DOCD IN RCRD: CPT | Mod: CPTII,S$GLB,, | Performed by: INTERNAL MEDICINE

## 2023-11-20 PROCEDURE — 1160F PR REVIEW ALL MEDS BY PRESCRIBER/CLIN PHARMACIST DOCUMENTED: ICD-10-PCS | Mod: CPTII,S$GLB,, | Performed by: INTERNAL MEDICINE

## 2023-11-20 PROCEDURE — 99212 PR OFFICE/OUTPT VISIT, EST, LEVL II, 10-19 MIN: ICD-10-PCS | Mod: GW,S$GLB,, | Performed by: INTERNAL MEDICINE

## 2023-11-20 PROCEDURE — 1125F AMNT PAIN NOTED PAIN PRSNT: CPT | Mod: CPTII,S$GLB,, | Performed by: INTERNAL MEDICINE

## 2023-11-20 PROCEDURE — 1159F PR MEDICATION LIST DOCUMENTED IN MEDICAL RECORD: ICD-10-PCS | Mod: CPTII,S$GLB,, | Performed by: INTERNAL MEDICINE

## 2023-11-20 PROCEDURE — 99212 OFFICE O/P EST SF 10 MIN: CPT | Mod: GW,S$GLB,, | Performed by: INTERNAL MEDICINE

## 2023-11-20 NOTE — PROGRESS NOTES
Subjective:       Patient ID: Steffany Brady is a 89 y.o. female.    Chief Complaint: Follow-up and Generalized Body Aches      Patient is established already .......... presents today for a f/u visit , to discuss labs results and for management of the chronic conditions.        Follow-up  This is a chronic problem. The current episode started more than 1 year ago. The problem occurs intermittently. The problem has been unchanged. Associated symptoms include arthralgias. Pertinent negatives include no fever. The symptoms are aggravated by standing. She has tried oral narcotics for the symptoms. The treatment provided moderate relief.     Review of Systems   Constitutional:  Negative for activity change, fever and unexpected weight change.   Respiratory: Negative.     Cardiovascular: Negative.    Musculoskeletal:  Positive for arthralgias.   Neurological: Negative.          Objective:      Physical Exam  Vitals and nursing note reviewed.   Constitutional:       Appearance: Normal appearance.      Comments: Frail elderly AA female   Cardiovascular:      Rate and Rhythm: Normal rate and regular rhythm.   Pulmonary:      Effort: Pulmonary effort is normal.      Breath sounds: Normal breath sounds.   Musculoskeletal:      Cervical back: Normal range of motion and neck supple.   Neurological:      Mental Status: She is alert.         Assessment:       1. Aching  Overview:  Rheumatological ROS: stable, mild-to-moderate joint symptoms intermittently, reasonably well controlled by PRN meds.   New concerns - no.   Exam: mild generalized muscle tenderness.   Assessment:  osteoarthritis stable.   Plan: current treatment plan is effective, no change in therapy.               Plan:       1. Aching  Overview:  Rheumatological ROS: stable, mild-to-moderate joint symptoms intermittently, reasonably well controlled by PRN meds.   New concerns - no.   Exam: mild generalized muscle tenderness.   Assessment:  osteoarthritis stable.   Plan:  current treatment plan is effective, no change in therapy.

## 2024-01-10 ENCOUNTER — OFFICE VISIT (OUTPATIENT)
Dept: FAMILY MEDICINE | Facility: CLINIC | Age: 89
End: 2024-01-10
Payer: MEDICARE

## 2024-01-10 VITALS
WEIGHT: 191 LBS | DIASTOLIC BLOOD PRESSURE: 71 MMHG | HEART RATE: 62 BPM | BODY MASS INDEX: 35.15 KG/M2 | OXYGEN SATURATION: 100 % | SYSTOLIC BLOOD PRESSURE: 139 MMHG | HEIGHT: 62 IN

## 2024-01-10 DIAGNOSIS — R42 DIZZINESS: Primary | ICD-10-CM

## 2024-01-10 DIAGNOSIS — F41.9 ANXIETY: ICD-10-CM

## 2024-01-10 DIAGNOSIS — K59.04 CHRONIC IDIOPATHIC CONSTIPATION: ICD-10-CM

## 2024-01-10 PROCEDURE — 99214 OFFICE O/P EST MOD 30 MIN: CPT | Mod: GW,S$GLB,, | Performed by: INTERNAL MEDICINE

## 2024-01-10 PROCEDURE — 1159F MED LIST DOCD IN RCRD: CPT | Mod: CPTII,S$GLB,, | Performed by: INTERNAL MEDICINE

## 2024-01-10 PROCEDURE — 1160F RVW MEDS BY RX/DR IN RCRD: CPT | Mod: CPTII,S$GLB,, | Performed by: INTERNAL MEDICINE

## 2024-01-10 RX ORDER — BUSPIRONE HYDROCHLORIDE 5 MG/1
5 TABLET ORAL 2 TIMES DAILY
Qty: 60 TABLET | Refills: 2 | Status: SHIPPED | OUTPATIENT
Start: 2024-01-10 | End: 2024-04-09

## 2024-01-10 NOTE — PROGRESS NOTES
Subjective:       Patient ID: Steffany Brady is a 89 y.o. female.    Chief Complaint: Hospital Follow Up (Dizzy, Anxious, lack of  enegry, SOB )      Patient is established already .......... presents today for a f/u visit , to discuss recent ER visit, dizziness and anxiety    Dizziness:   Chronicity:  Recurrent  Onset:  More than 1 year ago  Progression since onset:  Waxing and waning  Frequency:  Every few hours  Severity:  Mild  Duration:  Very brief  Dizziness characteristics:  Sensation of movement   Associated symptoms: hearing loss and light-headedness.no fever and no tinnitus.  Aggravated by:  Nothing  Risk factors:  Stroke  Treatments tried:  Valium  Improvements on treatment:  Mild   PMH includes: strokes and anxiety.    Review of Systems   Constitutional:  Negative for activity change, fever and unexpected weight change.   HENT:  Positive for hearing loss. Negative for tinnitus.    Respiratory: Negative.     Cardiovascular: Negative.    Neurological:  Positive for dizziness and light-headedness.         Objective:      Physical Exam  Vitals and nursing note reviewed.   Constitutional:       Appearance: Normal appearance.      Comments: Frail elderly AA female , NAD   Cardiovascular:      Rate and Rhythm: Normal rate and regular rhythm.   Pulmonary:      Effort: Pulmonary effort is normal.      Breath sounds: Normal breath sounds.   Musculoskeletal:      Cervical back: Normal range of motion and neck supple.   Skin:     General: Skin is warm.   Neurological:      General: No focal deficit present.      Mental Status: She is alert. She is disoriented.   Psychiatric:         Mood and Affect: Mood normal.         Assessment:       1. Dizziness  Overview:  Recent URI  Recent Strep throat  She was seen in ER @ [facility] 2 days ago    Assessment & Plan:  Stop ativan & HCZ  Recheck labs  Check TFTs        2. Anxiety  Overview:  Associated with living cond, dementia, being by herself most of the time      Assessment &  Plan:  Change ativan to buspar for less dizziness  T/c Hospice care  Continue home care , aid, MA ashlee pr and       3. Chronic idiopathic constipation  Overview:  Constipation: Patient complains of constipation.  Stool pattern has been 3 formed stool(s) per week. Onset was 3 years ago Defecation has been difficult. Co-Morbid conditions:none. Symptoms have been stable. Current Health Habits: Eating fiber? yes, amt not enough Exercise?no Water intake? yes, amt not enough Current OTC/RX therapy has been stimulant linzess which has been effective.        Assessment & Plan:  Cont colace  Refill linzess  Diet modif with inc fibers      Other orders  -     linaCLOtide (LINZESS) 290 mcg Cap capsule; Take 1 capsule (290 mcg total) by mouth before breakfast.  Dispense: 30 capsule; Refill: 2  -     busPIRone (BUSPAR) 5 MG Tab; Take 1 tablet (5 mg total) by mouth 2 (two) times daily.  Dispense: 60 tablet; Refill: 2           Plan:       1. Dizziness  Overview:  Recent URI  Recent Strep throat  She was seen in ER @ [facility] 2 days ago    Assessment & Plan:  Stop ativan & HCZ  Recheck labs  Check TFTs        2. Anxiety  Overview:  Associated with living cond, dementia, being by herself most of the time      Assessment & Plan:  Change ativan to buspar for less dizziness  T/c Hospice care  Continue home care , aid, MA ashlee [facility] and       3. Chronic idiopathic constipation  Overview:  Constipation: Patient complains of constipation.  Stool pattern has been 3 formed stool(s) per week. Onset was 3 years ago Defecation has been difficult. Co-Morbid conditions:none. Symptoms have been stable. Current Health Habits: Eating fiber? yes, amt not enough Exercise?no Water intake? yes, amt not enough Current OTC/RX therapy has been stimulant linzess which has been effective.        Assessment & Plan:  Cont colace  Refill linzess  Diet modif with inc fibers      Other orders  -     linaCLOtide (LINZESS) 290 mcg Cap capsule; Take 1  capsule (290 mcg total) by mouth before breakfast.  Dispense: 30 capsule; Refill: 2  -     busPIRone (BUSPAR) 5 MG Tab; Take 1 tablet (5 mg total) by mouth 2 (two) times daily.  Dispense: 60 tablet; Refill: 2

## 2024-01-10 NOTE — ASSESSMENT & PLAN NOTE
Change ativan to buspar for less dizziness  T/c Hospice care  Continue home care , aid, MA waiver prgm and SW

## 2024-01-30 ENCOUNTER — TELEPHONE (OUTPATIENT)
Dept: FAMILY MEDICINE | Facility: CLINIC | Age: 89
End: 2024-01-30
Payer: MEDICARE

## 2024-01-30 NOTE — TELEPHONE ENCOUNTER
----- Message from Delano Bernard MD sent at 1/10/2024  1:15 PM CST -----  Regarding: ER visit  Hi : Ms Jeter was seen at ER , [facility] in [place] on 1/8/24. Can you have labs, report of vsit sent to us.   Ty

## 2024-01-30 NOTE — TELEPHONE ENCOUNTER
----- Message from Delano Bernard MD sent at 1/10/2024  1:15 PM CST -----  Regarding: ER visit  Hi : Ms Jeter was seen at ER , [facility] in [place] on 1/8/24. Can you have labs, report of vsit sent to us.   Ty

## 2024-04-09 RX ORDER — BUSPIRONE HYDROCHLORIDE 5 MG/1
5 TABLET ORAL 2 TIMES DAILY
Qty: 60 TABLET | Refills: 0 | Status: SHIPPED | OUTPATIENT
Start: 2024-04-09 | End: 2024-05-13

## 2024-04-09 NOTE — TELEPHONE ENCOUNTER
No care due was identified.  [facility] Embedded Care Due Messages. Reference number: [id number].   4/09/2024 12:17:01 PM CDT

## 2024-04-09 NOTE — TELEPHONE ENCOUNTER
Refill Routing Note   Medication(s) are not appropriate for processing by [facility] for the following reason(s):        Outside of protocol    [facility] action(s):  Route               Appointments  past 12m or future 3m with PCP    Date Provider   Last Visit   1/10/2024 Delano Bernard MD   Next Visit   5/20/2024 Delano Bernard MD   ED visits in past 90 days: 0        Note composed:12:51 PM 04/09/2024

## 2024-04-14 NOTE — TELEPHONE ENCOUNTER
No care due was identified.  [facility] Embedded Care Due Messages. Reference number: [id number].   4/14/2024 8:24:24 AM CDT

## 2024-04-14 NOTE — TELEPHONE ENCOUNTER
Refill Routing Note   Medication(s) are not appropriate for processing by [facility] for the following reason(s):        Outside of protocol    [facility] action(s):  Route             Appointments  past 12m or future 3m with PCP    Date Provider   Last Visit   1/10/2024 Delano Bernard MD   Next Visit   5/20/2024 Delano Bernard MD   ED visits in past 90 days: 0        Note composed:9:31 AM 04/14/2024

## 2024-05-02 RX ORDER — CLOPIDOGREL BISULFATE 75 MG/1
75 TABLET ORAL
Qty: 90 TABLET | Refills: 2 | Status: SHIPPED | OUTPATIENT
Start: 2024-05-02

## 2024-05-02 NOTE — TELEPHONE ENCOUNTER
No care due was identified.  [facility] Embedded Care Due Messages. Reference number: [id number].   5/02/2024 1:45:22 PM CDT

## 2024-05-02 NOTE — TELEPHONE ENCOUNTER
Refill Routing Note   Medication(s) are not appropriate for processing by [facility] for the following reason(s):      Required labs abnormal    [facility] action(s):  Defer Care Due:  None identified            Appointments  past 12m or future 3m with PCP    Date Provider   Last Visit   1/10/2024 Delano Bernard MD   Next Visit   5/20/2024 Delano Bernard MD   ED visits in past 90 days: 0        Note composed:1:58 PM 05/02/2024

## 2024-05-13 RX ORDER — BUSPIRONE HYDROCHLORIDE 5 MG/1
5 TABLET ORAL 2 TIMES DAILY
Qty: 60 TABLET | Refills: 0 | Status: SHIPPED | OUTPATIENT
Start: 2024-05-13 | End: 2024-06-12

## 2024-05-13 NOTE — TELEPHONE ENCOUNTER
Refill Routing Note   Medication(s) are not appropriate for processing by [facility] for the following reason(s):        Outside of protocol    [facility] action(s):  Route             Appointments  past 12m or future 3m with PCP    Date Provider   Last Visit   1/10/2024 Delano Bernard MD   Next Visit   5/14/2024 Delano Bernard MD   ED visits in past 90 days: 0        Note composed:7:44 AM 05/13/2024

## 2024-05-13 NOTE — TELEPHONE ENCOUNTER
No care due was identified.  [facility] Embedded Care Due Messages. Reference number: [id number].   5/13/2024 7:44:34 AM CDT

## 2024-05-13 NOTE — TELEPHONE ENCOUNTER
----- Message from Destiney Cook sent at 5/13/2024  9:13 AM CDT -----  Contact: GEMMA, CAREGIVER  Type:  RX Refill Request    Who Called: GEMMA, CAREGIVER   Refill or New Rx: REFILL   RX Name and Strength: busPIRone (BUSPAR) 5 MG Tab  How is the patient currently taking it? (ex. 1XDay): ONE TAB TWICE A DAY   Is this a 30 day or 90 day RX: 90  [facility] with phone number:   [facility] [id number] [place] [facility] [address] [address]  [address] [address] [place] [address]  Phone: [phone] Fax: [phone]  Local or Mail Order:LOCAL  Ordering Provider: CHARLENE   Would the patient rather a call back or a response via [facility]? CALL   Best Call Back Number:[phone] (home) [phone] (work)  Additional Information: THANK YOU

## 2024-05-14 ENCOUNTER — OFFICE VISIT (OUTPATIENT)
Dept: FAMILY MEDICINE | Facility: CLINIC | Age: 89
End: 2024-05-14
Payer: MEDICARE

## 2024-05-14 ENCOUNTER — LAB VISIT (OUTPATIENT)
Dept: LAB | Facility: CLINIC | Age: 89
End: 2024-05-14
Payer: MEDICARE

## 2024-05-14 VITALS
BODY MASS INDEX: 32.37 KG/M2 | HEIGHT: 62 IN | DIASTOLIC BLOOD PRESSURE: 60 MMHG | TEMPERATURE: 99 F | SYSTOLIC BLOOD PRESSURE: 110 MMHG | HEART RATE: 69 BPM | WEIGHT: 175.88 LBS | OXYGEN SATURATION: 96 %

## 2024-05-14 DIAGNOSIS — R64 CACHEXIA: ICD-10-CM

## 2024-05-14 DIAGNOSIS — R54 FRAIL ELDERLY: ICD-10-CM

## 2024-05-14 DIAGNOSIS — E11.3293 TYPE 2 DIABETES MELLITUS WITH BOTH EYES AFFECTED BY MILD NONPROLIFERATIVE RETINOPATHY WITHOUT MACULAR EDEMA, WITHOUT LONG-TERM CURRENT USE OF INSULIN: ICD-10-CM

## 2024-05-14 DIAGNOSIS — E11.3293 TYPE 2 DIABETES MELLITUS WITH BOTH EYES AFFECTED BY MILD NONPROLIFERATIVE RETINOPATHY WITHOUT MACULAR EDEMA, WITHOUT LONG-TERM CURRENT USE OF INSULIN: Primary | ICD-10-CM

## 2024-05-14 DIAGNOSIS — N18.31 CHRONIC KIDNEY DISEASE (CKD) STAGE G3A/A1, MODERATELY DECREASED GLOMERULAR FILTRATION RATE (GFR) BETWEEN 45-59 ML/MIN/1.73 SQUARE METER AND ALBUMINURIA CREATININE RATIO LESS THAN 30 MG/G: ICD-10-CM

## 2024-05-14 DIAGNOSIS — R42 VERTIGO: ICD-10-CM

## 2024-05-14 DIAGNOSIS — M35.3 PMR (POLYMYALGIA RHEUMATICA): ICD-10-CM

## 2024-05-14 DIAGNOSIS — I11.0 HYPERTENSIVE HEART DISEASE WITH HEART FAILURE: ICD-10-CM

## 2024-05-14 DIAGNOSIS — Z00.00 ENCOUNTER FOR ANNUAL WELLNESS VISIT (AWV) IN MEDICARE PATIENT: ICD-10-CM

## 2024-05-14 DIAGNOSIS — N18.31 STAGE 3A CHRONIC KIDNEY DISEASE: ICD-10-CM

## 2024-05-14 PROCEDURE — 3074F SYST BP LT 130 MM HG: CPT | Mod: CPTII,S$GLB,, | Performed by: INTERNAL MEDICINE

## 2024-05-14 PROCEDURE — 83036 HEMOGLOBIN GLYCOSYLATED A1C: CPT | Performed by: INTERNAL MEDICINE

## 2024-05-14 PROCEDURE — G0439 PPPS, SUBSEQ VISIT: HCPCS | Mod: S$GLB,,, | Performed by: INTERNAL MEDICINE

## 2024-05-14 PROCEDURE — 1159F MED LIST DOCD IN RCRD: CPT | Mod: CPTII,S$GLB,, | Performed by: INTERNAL MEDICINE

## 2024-05-14 PROCEDURE — 99213 OFFICE O/P EST LOW 20 MIN: CPT | Mod: 25,S$GLB,, | Performed by: INTERNAL MEDICINE

## 2024-05-14 PROCEDURE — 1101F PT FALLS ASSESS-DOCD LE1/YR: CPT | Mod: CPTII,S$GLB,, | Performed by: INTERNAL MEDICINE

## 2024-05-14 PROCEDURE — 85025 COMPLETE CBC W/AUTO DIFF WBC: CPT | Performed by: INTERNAL MEDICINE

## 2024-05-14 PROCEDURE — 36415 COLL VENOUS BLD VENIPUNCTURE: CPT | Mod: ,,, | Performed by: INTERNAL MEDICINE

## 2024-05-14 PROCEDURE — 3288F FALL RISK ASSESSMENT DOCD: CPT | Mod: CPTII,S$GLB,, | Performed by: INTERNAL MEDICINE

## 2024-05-14 PROCEDURE — 80053 COMPREHEN METABOLIC PANEL: CPT | Performed by: INTERNAL MEDICINE

## 2024-05-14 PROCEDURE — 1160F RVW MEDS BY RX/DR IN RCRD: CPT | Mod: CPTII,S$GLB,, | Performed by: INTERNAL MEDICINE

## 2024-05-14 PROCEDURE — 1125F AMNT PAIN NOTED PAIN PRSNT: CPT | Mod: CPTII,S$GLB,, | Performed by: INTERNAL MEDICINE

## 2024-05-14 PROCEDURE — 1170F FXNL STATUS ASSESSED: CPT | Mod: CPTII,S$GLB,, | Performed by: INTERNAL MEDICINE

## 2024-05-14 PROCEDURE — 1158F ADVNC CARE PLAN TLK DOCD: CPT | Mod: CPTII,S$GLB,, | Performed by: INTERNAL MEDICINE

## 2024-05-14 PROCEDURE — 3078F DIAST BP <80 MM HG: CPT | Mod: CPTII,S$GLB,, | Performed by: INTERNAL MEDICINE

## 2024-05-14 RX ORDER — DICLOFENAC SODIUM 10 MG/G
2 GEL TOPICAL DAILY
Qty: 100 G | Refills: 0 | Status: SHIPPED | OUTPATIENT
Start: 2024-05-14 | End: 2024-05-29

## 2024-05-14 RX ORDER — MECLIZINE HYDROCHLORIDE 25 MG/1
50 TABLET ORAL 2 TIMES DAILY PRN
Qty: 60 TABLET | Refills: 0 | Status: SHIPPED | OUTPATIENT
Start: 2024-05-14 | End: 2024-06-13

## 2024-05-14 RX ORDER — MECLIZINE HYDROCHLORIDE 25 MG/1
50 TABLET ORAL 2 TIMES DAILY PRN
COMMUNITY
Start: 2024-05-03 | End: 2024-05-14 | Stop reason: SDUPTHER

## 2024-05-14 NOTE — PROGRESS NOTES
Subjective:       Patient ID: Steffany Brady is a 89 y.o. female.    Chief Complaint: Knee Pain (Hospital follow Right knee pain ), Otalgia, and Annual Exam      Frail  AWV HPI :-    Diet and Nutrition: healthy diet    Fracture Risk: no history of fractures; no recent explained fracture; no sudden unexplained fractures; previous musculoskeletal injuries    Physical Activity: doesnt exercise on a regular basis; recent dec. in physical activity;   v poor physical condition    Depression Risk: Occasionally feels sad, empty, or tearful; no loss of interest in activities; no significant changes in weight; no sleep disturbances or insomnia; no agitation; + loss of energy; no feelings of worthlessness or guilt; no thoughts of suicide; no history of depression; no history of mood disorders    Orientation:+ disorientation to time; + disorientation to date; + disorientation to place    Concentration and Memory: + decreased concentrating ability; + memory lapses / loss; he forgets words    Speech/Motor difficulties: no speech difficulties; no difficulty expressing formulated concepts; + difficulty with fine manipulative tasks; + difficulty writing/copying; + slowed reaction time; knocks things over when trying to pick them up    Hearing: Dec. hearing    Vision: no vision problems    Activities of Daily Living: Not able to bathe with limited or no assistance; not able to control urination and bowels; not able to dress with limited or no assistance; not able to feed self with limited or no assistance; not able to get out of chair or bed width limited or no assistance; not able to groom with limited or no assistance; not able to toilet with limited or no assistance  Instrumental Activities of Daily Living: not able to do house work with limited or no assistance; not able to grocery shop with limited or no assistance; not able to manage medications with limited or no assistance; not able to manage money with limited or no  assistance; not able to prepare meals with limited or no assistance; not able to use the phone with limited or no assistance    Falls Risknot Assessment: no frequent falls while walking :n/a .. no fall in the past year; no fall since last visit; no dizziness/vertigo    Home Safety: no unsafe heike hazards; no unsafe stairs; no unsafe gas appliances; working smoke/CO detectors; wears protective head gear for biking/high velocity; use of seat belts; practicing 'safer sex'; no vision or hearing loss while driving; no fire arms; has hand bars in the bathroom/shower; good lighting in the home        Knee Pain   The incident occurred more than 1 week ago. The incident occurred at home. There was no injury mechanism. The pain is present in the right knee. The pain is at a severity of 4/10. The pain is moderate. The pain has been Fluctuating since onset. Associated symptoms include muscle weakness. She reports no foreign bodies present. The symptoms are aggravated by movement and weight bearing. She has tried acetaminophen for the symptoms. The treatment provided mild relief.   Otalgia       Review of Systems   Constitutional:  Negative for activity change, fever and unexpected weight change.   HENT:  Positive for ear pain.    Respiratory: Negative.     Cardiovascular: Negative.    Musculoskeletal:  Positive for arthralgias and leg pain.   Neurological:  Positive for dizziness.         Review for Opioid Screening: Pt does not have Rx for Opioids (If patient has Rx list here)      Review for Substance Use Disorders: Patient does not use substance (If patient has Rx list here)       Objective:      Physical Exam  Frail PE :-    Constitutional:  General appearance: Frail elderly AA female sitting in her chair   Level of distress: : NAD   Ambulation:  Ambulates with difficulty      Head: Normocephalic, atraumatic.    ENMT    Oropharynx: Moist mucous membranes, no erythema, no exudates  Neck: Supple, trachea midline, no  masses,FROM      Lungs  Respiratory effort: Poor  Auscultation: Breath sounds normal, poor air movement, CTA except as noted, no wheezing or rales/crackles, few ronchi    Cardiovascular:  Heart auscultation:RRR, normal S1, normal S2, no murmurs, no rubs, no gallops.  Neck vessels: No JVD, no carotid bruits      Abdominal examination.  Bowel sounds:normal  Inspection and palpation: Soft, nondistended, no tenderness, no guarding, no rebound tenderness, no masses, no CVA tenderness.        Musculoskeletal.  Deferred due to wheelchair status    Neurologic.  Gait: Tested secondary to electric scooter status    Creased monofilament sensation in upper and lower extremities      Skin.  Inspection and palpation: No rash, no lesions, no ulcers, no abnormal nevi, no induration, no nodules, good turgor, no jaundice.    Back: Deferred.    I offered to discuss end of life issues, including information on how to make advance directives that the patient could use to name someone who would make medical decisions on their behalf if they became too ill to make themselves.      __Patient declined       _X__Patient is interested, I provided paperwork in the recent past and waiting on execution of a copy and offered to discuss further if needed         Assessment:       1. Type 2 diabetes mellitus with both eyes affected by mild nonproliferative retinopathy without macular edema, without long-term current use of insulin  Assessment & Plan:  Stable  Diet d/w pt, c/g  Monitor A1c    Orders:  -     Hemoglobin A1C; Future; Expected date: 05/14/2024    2. Hypertensive heart disease with heart failure  Assessment & Plan:  Compensated  Monitor   F/u with Card on 5/28    Orders:  -     Comprehensive Metabolic Panel; Future; Expected date: 05/14/2024  -     Ambulatory referral/consult to Hospice; Future; Expected date: 05/21/2024    3. Chronic kidney disease (CKD) stage G3a/A1, moderately decreased glomerular filtration rate (GFR) between 45-59  mL/min/1.73 square meter and albuminuria creatinine ratio less than 30 mg/g  -     Comprehensive Metabolic Panel; Future; Expected date: 05/14/2024  -     CBC Auto Differential; Future; Expected date: 05/14/2024  -     Ambulatory referral/consult to Hospice; Future; Expected date: 05/21/2024    4. Cachexia  Assessment & Plan:  Stable  T/c hospice     Orders:  -     CBC Auto Differential; Future; Expected date: 05/14/2024    5. PMR (polymyalgia rheumatica)  Assessment & Plan:  Stable  Cont POC       6. Stage 3a chronic kidney disease  Overview:  Monitor  I discussed renal condition with patient at length  We discussed diet modification specifically decreasing salt intake, avoiding fast food ,avoiding fried food  We also discussed the importance of minimizing the use of anti-inflammatory agents like Motrin ibuprofen naproxen Aleve etc.  We discussed the importance of increasing fluid intake specifically water and also stay hydrated  We discussed minimizing the use of diuretics as much as clinically possible  We are going to monitor renal functions BUN creatinine urine microalbumin and electrolytes closely  To consider referral to Nephrology Service if renal fx continue to decline  To consider adding an SGL- 2 inhibitor if renal functions continue to decline, regardless of the blood sugar status      Assessment & Plan:  Monitor GFR  Labs drawn today      7. Vertigo  Overview:  Recent URI  Recent Strep throat  She was seen in ER @ [facility] 2 days ago    Assessment & Plan:  Refill meclizine      8. Frail elderly  Overview:  With end stage CHF ,dementia      Assessment & Plan:  Hospice ref  Monitor   Falling precautions    Orders:  -     Ambulatory referral/consult to Hospice; Future; Expected date: 05/21/2024    9. Encounter for annual wellness visit (AWV) in Medicare patient  Overview:  [facility]    Assessment & Plan:  Get RSV , COVID vaccines  Diet , falling precautions d/w patient & c/g  I gave her a  copy of AMD in past. Await execution  We discussed tobacco, ETOH & physical activity  Dep screen : neg        Other orders  -     diclofenac sodium (VOLTAREN) 1 % Gel; Apply 2 g topically once daily. for 15 days  Dispense: 100 g; Refill: 0  -     meclizine (ANTIVERT) 25 mg tablet; Take 2 tablets (50 mg total) by mouth 2 (two) times daily as needed for Dizziness.  Dispense: 60 tablet; Refill: 0           Plan:       1. Type 2 diabetes mellitus with both eyes affected by mild nonproliferative retinopathy without macular edema, without long-term current use of insulin  Assessment & Plan:  Stable  Diet d/w pt, c/g  Monitor A1c    Orders:  -     Hemoglobin A1C; Future; Expected date: 05/14/2024    2. Hypertensive heart disease with heart failure  Assessment & Plan:  Compensated  Monitor   F/u with Card on 5/28    Orders:  -     Comprehensive Metabolic Panel; Future; Expected date: 05/14/2024  -     Ambulatory referral/consult to Hospice; Future; Expected date: 05/21/2024    3. Chronic kidney disease (CKD) stage G3a/A1, moderately decreased glomerular filtration rate (GFR) between 45-59 mL/min/1.73 square meter and albuminuria creatinine ratio less than 30 mg/g  -     Comprehensive Metabolic Panel; Future; Expected date: 05/14/2024  -     CBC Auto Differential; Future; Expected date: 05/14/2024  -     Ambulatory referral/consult to Hospice; Future; Expected date: 05/21/2024    4. Cachexia  Assessment & Plan:  Stable  T/c hospice     Orders:  -     CBC Auto Differential; Future; Expected date: 05/14/2024    5. PMR (polymyalgia rheumatica)  Assessment & Plan:  Stable  Cont POC       6. Stage 3a chronic kidney disease  Overview:  Monitor  I discussed renal condition with patient at length  We discussed diet modification specifically decreasing salt intake, avoiding fast food ,avoiding fried food  We also discussed the importance of minimizing the use of anti-inflammatory agents like Motrin ibuprofen naproxen Aleve etc.  We  discussed the importance of increasing fluid intake specifically water and also stay hydrated  We discussed minimizing the use of diuretics as much as clinically possible  We are going to monitor renal functions BUN creatinine urine microalbumin and electrolytes closely  To consider referral to Nephrology Service if renal fx continue to decline  To consider adding an SGL- 2 inhibitor if renal functions continue to decline, regardless of the blood sugar status      Assessment & Plan:  Monitor GFR  Labs drawn today      7. Vertigo  Overview:  Recent URI  Recent Strep throat  She was seen in ER @ [facility] 2 days ago    Assessment & Plan:  Refill meclizine      8. Frail elderly  Overview:  With end stage CHF ,dementia      Assessment & Plan:  Hospice ref  Monitor   Falling precautions    Orders:  -     Ambulatory referral/consult to Hospice; Future; Expected date: 05/21/2024    9. Encounter for annual wellness visit (AWV) in Medicare patient  Overview:  She presents for a [employer] AWV    Assessment & Plan:  Get RSV , COVID vaccines  Diet , falling precautions d/w patient & c/g  I gave her a copy of AMD in past. Await execution  We discussed tobacco, ETOH & physical activity  Dep screen : neg        Other orders  -     diclofenac sodium (VOLTAREN) 1 % Gel; Apply 2 g topically once daily. for 15 days  Dispense: 100 g; Refill: 0  -     meclizine (ANTIVERT) 25 mg tablet; Take 2 tablets (50 mg total) by mouth 2 (two) times daily as needed for Dizziness.  Dispense: 60 tablet; Refill: 0

## 2024-05-14 NOTE — ASSESSMENT & PLAN NOTE
Get RSV , COVID vaccines  Diet , falling precautions d/w patient & c/g  I gave her a copy of AMD in past. Await execution  We discussed tobacco, ETOH & physical activity  Dep screen : neg

## 2024-05-15 LAB
ALBUMIN SERPL BCP-MCNC: 3.3 G/DL (ref 3.5–5.2)
ALP SERPL-CCNC: 42 U/L (ref 55–135)
ALT SERPL W/O P-5'-P-CCNC: 7 U/L (ref 10–44)
ANION GAP SERPL CALC-SCNC: 7 MMOL/L (ref 8–16)
AST SERPL-CCNC: 12 U/L (ref 10–40)
BASOPHILS # BLD AUTO: 0.02 K/UL (ref 0–0.2)
BASOPHILS NFR BLD: 0.5 % (ref 0–1.9)
BILIRUB SERPL-MCNC: 0.5 MG/DL (ref 0.1–1)
BUN SERPL-MCNC: 13 MG/DL (ref 10–30)
CALCIUM SERPL-MCNC: 8.8 MG/DL (ref 8.7–10.5)
CHLORIDE SERPL-SCNC: 107 MMOL/L (ref 95–110)
CO2 SERPL-SCNC: 26 MMOL/L (ref 23–29)
CREAT SERPL-MCNC: 1.2 MG/DL (ref 0.5–1.4)
DIFFERENTIAL METHOD BLD: ABNORMAL
EOSINOPHIL # BLD AUTO: 0 K/UL (ref 0–0.5)
EOSINOPHIL NFR BLD: 1.1 % (ref 0–8)
ERYTHROCYTE [DISTWIDTH] IN BLOOD BY AUTOMATED COUNT: 14 % (ref 11.5–14.5)
EST. GFR  (NO RACE VARIABLE): 42.2 ML/MIN/1.73 M^2
ESTIMATED AVG GLUCOSE: 114 MG/DL (ref 68–131)
GLUCOSE SERPL-MCNC: 98 MG/DL (ref 70–110)
HBA1C MFR BLD: 5.6 % (ref 4–5.6)
HCT VFR BLD AUTO: 34.6 % (ref 37–48.5)
HGB BLD-MCNC: 10.9 G/DL (ref 12–16)
IMM GRANULOCYTES # BLD AUTO: 0 K/UL (ref 0–0.04)
IMM GRANULOCYTES NFR BLD AUTO: 0 % (ref 0–0.5)
LYMPHOCYTES # BLD AUTO: 1.2 K/UL (ref 1–4.8)
LYMPHOCYTES NFR BLD: 33.3 % (ref 18–48)
MCH RBC QN AUTO: 29.5 PG (ref 27–31)
MCHC RBC AUTO-ENTMCNC: 31.5 G/DL (ref 32–36)
MCV RBC AUTO: 94 FL (ref 82–98)
MONOCYTES # BLD AUTO: 0.6 K/UL (ref 0.3–1)
MONOCYTES NFR BLD: 15.1 % (ref 4–15)
NEUTROPHILS # BLD AUTO: 1.9 K/UL (ref 1.8–7.7)
NEUTROPHILS NFR BLD: 50 % (ref 38–73)
NRBC BLD-RTO: 0 /100 WBC
PLATELET # BLD AUTO: 147 K/UL (ref 150–450)
PMV BLD AUTO: 11.8 FL (ref 9.2–12.9)
POTASSIUM SERPL-SCNC: 3.5 MMOL/L (ref 3.5–5.1)
PROT SERPL-MCNC: 7.2 G/DL (ref 6–8.4)
RBC # BLD AUTO: 3.69 M/UL (ref 4–5.4)
SODIUM SERPL-SCNC: 140 MMOL/L (ref 136–145)
WBC # BLD AUTO: 3.72 K/UL (ref 3.9–12.7)

## 2024-05-22 ENCOUNTER — TELEPHONE (OUTPATIENT)
Dept: FAMILY MEDICINE | Facility: CLINIC | Age: 89
End: 2024-05-22
Payer: MEDICARE

## 2024-05-22 NOTE — TELEPHONE ENCOUNTER
Brianna Gaines,  Please review message below from patient's relative Corinne Sánchez on patient's condition.  Call placed to pt due to Dr. Gaines's orders to check on pt. Spoke w/Corinne Pascuals(relative) states pt was transported per ambulance to [facility] no update at this time but was informed that pt was having chest pains and bilateral swelling legs.   Please review.  Thank you.  Signed:  KYRA Whatley Magdy, MD P Gorgi Mikhail Magdy Staff  Caller: Unspecified (Today,  8:57 AM)  Hi Vivian :  the hospice nurse called me and said that she went to the emergency room so please double check on that and see  if she still needs something for gas  she can use over-the-counter gas X and that will not interfere with her medication   and I told nurses to call her and check on her asap   thank you    Brianna Gaines,  Please review message below from Corine/Meggan Loja concerning patient's current condition.  PT NEEDS HOME HEALTH TO ASSIST HER ASAP.   BP READING FROM 5/22/24 7:15A : 148/100  AND PT IS COMPLAINING OF GAS. PT DOES NOT WANT TO TAKE VENO OTC MED BECAUSE IT MAY CONFLICT WITH HER CURRENT RX'S  Last office visit: 5/14/2024  Thank you.  Signed:  Jeremy Flores LPN  ----- Message from Destineyernie Cook sent at 5/22/2024  8:51 AM CDT -----  Contact: MEGGAN MENENDEZ  Type:  Needs Medical Advice    Who Called: MEGGAN MENENDEZ   Symptoms (please be specific):  PT NEEDS HOME HEALTH TO ASSIST HER ASAP.   BP READING FROM 5/22/24 7:15A : 148/100  AND PT IS COMPLAINING OF GAS. PT DOES NOT WANT TO TAKE VENO OTC MED BECAUSE IT MAY CONFLICT WITH HER CURRENT RX'S  How long has patient had these symptoms: 2 WEEKS   Pharmacy name and phone #:    [facility] - [place] - [address]  [address] [place] [place] [address]  Phone: [phone] Fax: [phone]  Would the patient rather a call back or a response via [facility]? CALL   Best Call Back  Number: [phone] (home) [phone] (work)  Additional Information: THANK YOU

## 2024-06-17 ENCOUNTER — OFFICE VISIT (OUTPATIENT)
Dept: FAMILY MEDICINE | Facility: CLINIC | Age: 89
End: 2024-06-17
Payer: MEDICARE

## 2024-06-17 VITALS
WEIGHT: 170 LBS | DIASTOLIC BLOOD PRESSURE: 70 MMHG | OXYGEN SATURATION: 99 % | HEART RATE: 53 BPM | BODY MASS INDEX: 31.28 KG/M2 | SYSTOLIC BLOOD PRESSURE: 115 MMHG | HEIGHT: 62 IN

## 2024-06-17 DIAGNOSIS — I50.32 CHRONIC DIASTOLIC CONGESTIVE HEART FAILURE: ICD-10-CM

## 2024-06-17 DIAGNOSIS — I25.10 CORONARY ARTERY DISEASE INVOLVING NATIVE CORONARY ARTERY OF NATIVE HEART WITHOUT ANGINA PECTORIS: ICD-10-CM

## 2024-06-17 DIAGNOSIS — I10 PRIMARY HYPERTENSION: ICD-10-CM

## 2024-06-17 DIAGNOSIS — R53.1 WEAKNESS: ICD-10-CM

## 2024-06-17 DIAGNOSIS — I50.9 CONGESTIVE HEART FAILURE, UNSPECIFIED HF CHRONICITY, UNSPECIFIED HEART FAILURE TYPE: Primary | ICD-10-CM

## 2024-06-17 DIAGNOSIS — E78.2 MIXED HYPERLIPIDEMIA: ICD-10-CM

## 2024-06-17 PROCEDURE — 1159F MED LIST DOCD IN RCRD: CPT | Mod: CPTII,S$GLB,, | Performed by: INTERNAL MEDICINE

## 2024-06-17 PROCEDURE — G0180 MD CERTIFICATION HHA PATIENT: HCPCS | Mod: S$GLB,,, | Performed by: INTERNAL MEDICINE

## 2024-06-17 PROCEDURE — 99214 OFFICE O/P EST MOD 30 MIN: CPT | Mod: 25,S$GLB,, | Performed by: INTERNAL MEDICINE

## 2024-06-17 PROCEDURE — 1160F RVW MEDS BY RX/DR IN RCRD: CPT | Mod: CPTII,S$GLB,, | Performed by: INTERNAL MEDICINE

## 2024-06-17 PROCEDURE — 1126F AMNT PAIN NOTED NONE PRSNT: CPT | Mod: CPTII,S$GLB,, | Performed by: INTERNAL MEDICINE

## 2024-06-17 NOTE — PATIENT INSTRUCTIONS
Stop plavix ( Clopidogrel )  Stop aspirin  Stop Amlodipine  Stop lipitor ( Itching )  Take coreg ( Carvedilol ) twice a day    Your Card name is Dr Ocampo    See you back in Sept

## 2024-06-17 NOTE — ASSESSMENT & PLAN NOTE
Follow-up with   Hold antiplatelets  Patient is already on anti coag  Risks of falling outweighs the benefits  Continue home care

## 2024-06-17 NOTE — ASSESSMENT & PLAN NOTE
She saw her cardiologist  She is on carvedilol  She is off her amlodipine  Continue lisinopril  Hold aspirin and Plavix because of her advanced age and risks of falling  Because she is already on Eliquis

## 2024-06-17 NOTE — PROGRESS NOTES
Subjective:       Patient ID: Steffany Brady is a 89 y.o. female.    Chief Complaint: Follow-up (B/P)      Patient is established already .......... presents today for a f/u visit , to discuss BP and for management of the chronic conditions dafne HTN, CAD & CHF        Follow-up  This is a chronic problem. The current episode started more than 1 year ago. The problem occurs intermittently. The problem has been waxing and waning. Associated symptoms include weakness. Pertinent negatives include no fever. The symptoms are aggravated by exertion. Treatments tried: See MAR. The treatment provided significant relief.     Review of Systems   Constitutional:  Negative for activity change, fever and unexpected weight change.   Respiratory:  Positive for shortness of breath.    Cardiovascular: Negative.    Neurological:  Positive for weakness.         Objective:      Physical Exam  Vitals and nursing note reviewed.   Constitutional:       Appearance: Normal appearance.      Comments: Frail elderly AA female, NAD   Cardiovascular:      Rate and Rhythm: Normal rate and regular rhythm.   Pulmonary:      Effort: Pulmonary effort is normal.      Breath sounds: Normal breath sounds.   Musculoskeletal:      Cervical back: Normal range of motion and neck supple.      Right lower leg: No edema.      Left lower leg: No edema.   Neurological:      Mental Status: She is alert.         Assessment:       1. Congestive heart failure, unspecified HF chronicity, unspecified heart failure type [I50.9]    2. Weakness [R53.1]    3. Primary hypertension  Overview:  Stable    Assessment & Plan:  She saw her cardiologist  She is on carvedilol  She is off her amlodipine  Continue lisinopril  Hold aspirin and Plavix because of her advanced age and risks of falling  Because she is already on Eliquis      4. Coronary artery disease involving native coronary artery of native heart without angina pectoris  Overview:  Stable    Assessment & Plan:  Follow-up  with Dr.Agha Raines antiplatelets  Patient is already on anti coag  Risks of falling outweighs the benefits  Continue home care      5. Mixed hyperlipidemia  Overview:  She has of the Lipitor because of side    Assessment & Plan:  Monitor  No need for Lipitor because of her advanced age      6. Chronic diastolic congestive heart failure  Overview:  Stable    Assessment & Plan:  F/u with home care  I signed admission to home care today              Plan:       1. Congestive heart failure, unspecified HF chronicity, unspecified heart failure type [I50.9]    2. Weakness [R53.1]    3. Primary hypertension  Overview:  Stable    Assessment & Plan:  She saw her cardiologist  She is on carvedilol  She is off her amlodipine  Continue lisinopril  Hold aspirin and Plavix because of her advanced age and risks of falling  Because she is already on Eliquis      4. Coronary artery disease involving native coronary artery of native heart without angina pectoris  Overview:  Stable    Assessment & Plan:  Follow-up with Dr.Agha Raines antiplatelets  Patient is already on anti coag  Risks of falling outweighs the benefits  Continue home care      5. Mixed hyperlipidemia  Overview:  She has of the Lipitor because of side    Assessment & Plan:  Monitor  No need for Lipitor because of her advanced age      6. Chronic diastolic congestive heart failure  Overview:  Stable    Assessment & Plan:  F/u with home care  I signed admission to home care today

## 2024-07-20 NOTE — TELEPHONE ENCOUNTER
No care due was identified.  [facility] [place] Embedded Care Due Messages. Reference number: [id number].   7/20/2024 10:49:47 AM CDT

## 2024-07-20 NOTE — TELEPHONE ENCOUNTER
Refill Routing Note   Medication(s) are not appropriate for processing by [facility] for the following reason(s):      Medication outside of protocol    [facility] action(s):  Route Care Due:  None identified            Appointments  past 12m or future 3m with PCP    Date Provider   Last Visit   6/17/2024 Delano Bernard MD   Next Visit   8/14/2024 Delano Bernard MD   ED visits in past 90 days: 0        Note composed:11:54 AM 07/20/2024

## 2024-07-24 PROCEDURE — 99499 UNLISTED E&M SERVICE: CPT | Mod: ,,, | Performed by: INTERNAL MEDICINE

## 2024-08-15 ENCOUNTER — EXTERNAL HOME HEALTH (OUTPATIENT)
Dept: HOME HEALTH SERVICES | Facility: HOSPITAL | Age: 89
End: 2024-08-15
Payer: MEDICARE

## 2024-08-19 PROBLEM — Z00.00 ENCOUNTER FOR ANNUAL WELLNESS VISIT (AWV) IN MEDICARE PATIENT: Status: RESOLVED | Noted: 2023-05-02 | Resolved: 2024-08-19

## 2024-08-29 ENCOUNTER — TELEPHONE (OUTPATIENT)
Dept: FAMILY MEDICINE | Facility: CLINIC | Age: 89
End: 2024-08-29
Payer: MEDICARE

## 2024-08-29 NOTE — TELEPHONE ENCOUNTER
Contacted pt to schedule hospital f/u visit with PCP, caretaker Silva mentions pt still inpatient Unit in [facility] and will call back upon discharge.

## 2024-09-17 ENCOUNTER — OFFICE VISIT (OUTPATIENT)
Dept: FAMILY MEDICINE | Facility: CLINIC | Age: 89
End: 2024-09-17
Payer: MEDICARE

## 2024-09-17 VITALS
DIASTOLIC BLOOD PRESSURE: 72 MMHG | HEART RATE: 65 BPM | OXYGEN SATURATION: 98 % | HEIGHT: 62 IN | SYSTOLIC BLOOD PRESSURE: 138 MMHG | BODY MASS INDEX: 32.41 KG/M2 | WEIGHT: 176.13 LBS

## 2024-09-17 DIAGNOSIS — Z09 HOSPITAL DISCHARGE FOLLOW-UP: Primary | ICD-10-CM

## 2024-09-17 DIAGNOSIS — R54 FRAIL ELDERLY: ICD-10-CM

## 2024-09-17 RX ORDER — HYDRALAZINE HYDROCHLORIDE 25 MG/1
25 TABLET, FILM COATED ORAL 2 TIMES DAILY
Qty: 180 TABLET | Refills: 1 | Status: SHIPPED | OUTPATIENT
Start: 2024-09-17 | End: 2025-03-16

## 2024-09-17 RX ORDER — HYDRALAZINE HYDROCHLORIDE 25 MG/1
25 TABLET, FILM COATED ORAL 2 TIMES DAILY
COMMUNITY
Start: 2024-09-05 | End: 2024-09-17 | Stop reason: SDUPTHER

## 2024-09-17 NOTE — PROGRESS NOTES
Subjective:       Patient ID: Steffany Brady is a 89 y.o. female.    Chief Complaint: Follow-up and Hospital Follow Up      TCM VISIT : 9/17/24    Admit date : 8/30/24  Med Reconcilliation Completed : Yes, reviewed with patient  DME Orders : Cane  Referrals : n/a  Education : Diagnosis & treatment d/w patient  Discharge Date : 9/5/24  Discharge Diagnosis : Orthostatic hypotension  48 Hour TCM Call : per Office RN        Follow-up  Associated symptoms include fatigue. Pertinent negatives include no fever.   Hypertension  This is a chronic problem. The current episode started more than 1 year ago. The problem has been waxing and waning since onset. The problem is uncontrolled. Associated symptoms include anxiety. There are no associated agents to hypertension. Risk factors for coronary artery disease include dyslipidemia, family history, post-menopausal state, stress and sedentary lifestyle. Past treatments include direct vasodilators, calcium channel blockers, beta blockers and ACE inhibitors. The current treatment provides significant improvement. Compliance problems include exercise, diet and psychosocial issues.  Hypertensive end-organ damage includes kidney disease. Identifiable causes of hypertension include chronic renal disease.     Review of Systems   Constitutional:  Positive for fatigue. Negative for activity change, fever and unexpected weight change.   Respiratory: Negative.     Cardiovascular: Negative.    Integumentary:         itching   Neurological:  Positive for dizziness and light-headedness.         Objective:      Physical Exam  Vitals and nursing note reviewed.   Constitutional:       Appearance: Normal appearance.      Comments: V frail elderly AA female , uses a cane for ambulation   Cardiovascular:      Rate and Rhythm: Normal rate and regular rhythm.   Pulmonary:      Effort: Pulmonary effort is normal.      Breath sounds: Normal breath sounds.   Musculoskeletal:      Cervical back: Normal range  of motion and neck supple.   Neurological:      Mental Status: She is alert.         Assessment:       1. Hospital discharge follow-up  Overview:  TCM VISIT : 9/17/24    Admit date : 8/30/24  Med Reconcilliation Completed : Yes, reviewed with patient  DME Orders : Cane  Referrals : n/a  Education : Diagnosis & treatment d/w patient  Discharge Date : 9/5/24  Discharge Diagnosis : Orthostatic hypotension  48 Hour TCM Call : per Office RN        Other orders  -     hydrALAZINE (APRESOLINE) 25 MG tablet; Take 1 tablet (25 mg total) by mouth 2 (two) times daily.  Dispense: 180 tablet; Refill: 1           Plan:       1. Hospital discharge follow-up  Overview:  TCM VISIT : 9/17/24    Admit date : 8/30/24  Med Reconcilliation Completed : Yes, reviewed with patient  DME Orders : Cane  Referrals : n/a  Education : Diagnosis & treatment d/w patient  Discharge Date : 9/5/24  Discharge Diagnosis : Orthostatic hypotension  48 Hour TCM Call : per Office RN        Other orders  -     hydrALAZINE (APRESOLINE) 25 MG tablet; Take 1 tablet (25 mg total) by mouth 2 (two) times daily.  Dispense: 180 tablet; Refill: 1

## 2024-09-27 ENCOUNTER — DOCUMENT SCAN (OUTPATIENT)
Dept: HOME HEALTH SERVICES | Facility: HOSPITAL | Age: 89
End: 2024-09-27
Payer: MEDICARE

## 2024-10-09 NOTE — TELEPHONE ENCOUNTER
No care due was identified.  [facility] Embedded Care Due Messages. Reference number: [id number].   10/09/2024 2:46:31 PM CDT

## 2024-10-10 RX ORDER — BUSPIRONE HYDROCHLORIDE 5 MG/1
5 TABLET ORAL 2 TIMES DAILY
Qty: 180 TABLET | Refills: 0 | Status: SHIPPED | OUTPATIENT
Start: 2024-10-10

## 2024-10-10 NOTE — TELEPHONE ENCOUNTER
Refill Routing Note   Medication(s) are not appropriate for processing by [facility] for the following reason(s):        Outside of protocol    [facility] action(s):  Route             Appointments  past 12m or future 3m with PCP    Date Provider   Last Visit   9/17/2024 Delano Bernard MD   Next Visit   10/17/2024 Delano Bernard MD   ED visits in past 90 days: 0        Note composed:8:01 AM 10/10/2024

## 2024-10-17 ENCOUNTER — TELEPHONE (OUTPATIENT)
Dept: FAMILY MEDICINE | Facility: CLINIC | Age: 89
End: 2024-10-17

## 2024-10-21 ENCOUNTER — OFFICE VISIT (OUTPATIENT)
Dept: FAMILY MEDICINE | Facility: CLINIC | Age: 89
End: 2024-10-21
Payer: MEDICARE

## 2024-10-21 VITALS
DIASTOLIC BLOOD PRESSURE: 56 MMHG | HEART RATE: 104 BPM | BODY MASS INDEX: 32.94 KG/M2 | HEIGHT: 62 IN | OXYGEN SATURATION: 96 % | SYSTOLIC BLOOD PRESSURE: 100 MMHG | WEIGHT: 179 LBS

## 2024-10-21 DIAGNOSIS — Z23 NEED FOR PROPHYLACTIC VACCINATION AND INOCULATION AGAINST CHOLERA ALONE: Primary | ICD-10-CM

## 2024-10-21 PROCEDURE — 1126F AMNT PAIN NOTED NONE PRSNT: CPT | Mod: CPTII,S$GLB,, | Performed by: INTERNAL MEDICINE

## 2024-10-21 PROCEDURE — 1159F MED LIST DOCD IN RCRD: CPT | Mod: CPTII,S$GLB,, | Performed by: INTERNAL MEDICINE

## 2024-10-21 PROCEDURE — 90653 IIV ADJUVANT VACCINE IM: CPT | Mod: S$GLB,,, | Performed by: INTERNAL MEDICINE

## 2024-10-21 PROCEDURE — 1101F PT FALLS ASSESS-DOCD LE1/YR: CPT | Mod: CPTII,S$GLB,, | Performed by: INTERNAL MEDICINE

## 2024-10-21 PROCEDURE — G0008 ADMIN INFLUENZA VIRUS VAC: HCPCS | Mod: S$GLB,,, | Performed by: INTERNAL MEDICINE

## 2024-10-21 PROCEDURE — 3288F FALL RISK ASSESSMENT DOCD: CPT | Mod: CPTII,S$GLB,, | Performed by: INTERNAL MEDICINE

## 2024-10-21 PROCEDURE — 1160F RVW MEDS BY RX/DR IN RCRD: CPT | Mod: CPTII,S$GLB,, | Performed by: INTERNAL MEDICINE

## 2024-10-21 PROCEDURE — 99214 OFFICE O/P EST MOD 30 MIN: CPT | Mod: 25,S$GLB,, | Performed by: INTERNAL MEDICINE

## 2024-10-21 RX ORDER — FUROSEMIDE 40 MG/1
40 TABLET ORAL DAILY PRN
Qty: 30 TABLET | Refills: 2 | Status: SHIPPED | OUTPATIENT
Start: 2024-10-21 | End: 2025-01-19

## 2024-10-21 RX ORDER — SPIRONOLACTONE 25 MG/1
25 TABLET ORAL
COMMUNITY

## 2024-10-21 NOTE — PROGRESS NOTES
Subjective:       Patient ID: Steffany Brady is a 89 y.o. female.    Chief Complaint: Foot Swelling (Patient states her feet started to swell last week. )      History of Present Illness    CHIEF COMPLAINT:  Steffany presents with significant leg swelling, shortness of breath, and difficulty lying flat, suggesting symptoms of congestive heart failure.    HPI:  Steffany reports significant leg swelling, shortness of breath, and difficulty lying flat. She has nocturnal dyspnea and orthopnea, requiring her to sit up in bed due to breathing difficulties.    Steffany had an appointment with Dr. Hutchinson 5 days ago, on Wednesday. Dr. Hutchinson discussed the possibility of a pacemaker but did not order a venous ultrasound to check for blood clots. Steffany was prescribed medications for chest pain, hypertension, atrial fibrillation, and coronary artery disease, including spironolactone. She was instructed to follow up in 6 weeks.    Steffany currently has home care services and mentions a potential transition to hospice care. She has not received home oxygen. Steffany denies recent hospitalization or receiving a clean bill of health from her recent doctor's appointment.    MEDICATIONS:  Steffany is on Prozac (Fluoxetine) and Eliquis. She is also taking a cholesterol medication, though the specific name is not mentioned. Her medication regimen includes Plavix, Vitamin B12, Flonase, Hydralazine, Linzess, Meclizine, and Nystatin. She is on Spironolactone for chest pain, hypertension, atrial fibrillation, and coronary artery disease.    MEDICAL HISTORY:  Steffany has a history of hypertension, atrial fibrillation, coronary artery disease, and congestive heart failure.         Review of Systems   Constitutional:  Negative for activity change, fever and unexpected weight change.   Respiratory:  Positive for shortness of breath.    Cardiovascular:  Positive for leg swelling.   Neurological: Negative.          Objective:      Physical Exam  Vitals and nursing  note reviewed. Exam conducted with a chaperone present.   Constitutional:       Appearance: She is obese.   HENT:      Head: Normocephalic and atraumatic.   Eyes:      Extraocular Movements: Extraocular movements intact.      Pupils: Pupils are equal, round, and reactive to light.   Cardiovascular:      Rate and Rhythm: Normal rate and regular rhythm.      Pulses: Normal pulses.      Heart sounds: Normal heart sounds.   Pulmonary:      Effort: Pulmonary effort is normal.      Breath sounds: Normal breath sounds. No rales.   Abdominal:      General: Abdomen is flat. Bowel sounds are normal.      Palpations: Abdomen is soft.   Musculoskeletal:      Cervical back: Normal range of motion and neck supple.      Right lower leg: Edema present.      Left lower leg: Edema present.   Skin:     Findings: No lesion.   Neurological:      Mental Status: She is alert.         Assessment:       1. Need for prophylactic vaccination and inoculation against cholera alone  -     influenza (adjuvanted) (Fluad) 45 mcg/0.5 mL IM vaccine (> or = 66 yo) 0.5 mL    Other orders  -     furosemide (LASIX) 40 MG tablet; Take 1 tablet (40 mg total) by mouth daily as needed (leg swelling).  Dispense: 30 tablet; Refill: 2           Plan:       Assessment & Plan    Assessed patient's recent doctor visit and current symptoms, noting significant leg swelling and shortness of breath  Evaluated current medication regimen, including spironolactone for fluid retention  Considered possibility of congestive heart failure given patient's symptoms and inability to lie flat  Determined need for stronger diuretic therapy to address edema and dyspnea  Assessed need for home oxygen given patient's reported breathing difficulties    CONGESTIVE HEART FAILURE:  - Discussed information about congestive heart failure.  - Started Lasix (furosemide) as needed, 2-3 times per week for leg swelling.    MEDICATIONS/SUPPLEMENTS:  - Viola to use a pill organizer for  medication management.  - Continued current medications including Prozac, Eliquis, cholesterol medication, Plavix, vitamin B12, Flonase, hydralazine, Linzess, meclizine, nystatin, and spironolactone.    FLU VACCINATION:  - Flu shot administered in office.    HOSPICE REFERRAL:  - Referred to hospice care for additional support and potential home oxygen setup.    FOLLOW UP:  - Follow up in 3 months.       Steffany was seen today for foot swelling.    Diagnoses and all orders for this visit:    Need for prophylactic vaccination and inoculation against cholera alone  -     influenza (adjuvanted) (Fluad) 45 mcg/0.5 mL IM vaccine (> or = 66 yo) 0.5 mL    Other orders  -     furosemide (LASIX) 40 MG tablet; Take 1 tablet (40 mg total) by mouth daily as needed (leg swelling).

## 2024-11-25 ENCOUNTER — EXTERNAL HOME HEALTH (OUTPATIENT)
Dept: HOME HEALTH SERVICES | Facility: HOSPITAL | Age: 89
End: 2024-11-25
Payer: MEDICARE

## 2024-12-06 ENCOUNTER — DOCUMENT SCAN (OUTPATIENT)
Dept: HOME HEALTH SERVICES | Facility: HOSPITAL | Age: 89
End: 2024-12-06
Payer: MEDICARE